# Patient Record
Sex: MALE | Race: ASIAN | NOT HISPANIC OR LATINO | URBAN - METROPOLITAN AREA
[De-identification: names, ages, dates, MRNs, and addresses within clinical notes are randomized per-mention and may not be internally consistent; named-entity substitution may affect disease eponyms.]

---

## 2022-02-09 ENCOUNTER — INPATIENT (INPATIENT)
Facility: HOSPITAL | Age: 62
LOS: 3 days | Discharge: HOME | DRG: 872 | End: 2022-02-13
Attending: SPECIALIST | Admitting: SPECIALIST
Payer: MEDICAID

## 2022-02-09 VITALS
SYSTOLIC BLOOD PRESSURE: 136 MMHG | TEMPERATURE: 98 F | HEIGHT: 68 IN | WEIGHT: 169.98 LBS | RESPIRATION RATE: 20 BRPM | OXYGEN SATURATION: 97 % | DIASTOLIC BLOOD PRESSURE: 83 MMHG | HEART RATE: 125 BPM

## 2022-02-09 DIAGNOSIS — K81.0 ACUTE CHOLECYSTITIS: ICD-10-CM

## 2022-02-09 DIAGNOSIS — K81.9 CHOLECYSTITIS, UNSPECIFIED: ICD-10-CM

## 2022-02-09 LAB
ALBUMIN SERPL ELPH-MCNC: 2.9 G/DL — LOW (ref 3.3–5)
ALP SERPL-CCNC: 87 U/L — SIGNIFICANT CHANGE UP (ref 40–120)
ALT FLD-CCNC: 29 U/L — SIGNIFICANT CHANGE UP (ref 12–78)
ANION GAP SERPL CALC-SCNC: 9 MMOL/L — SIGNIFICANT CHANGE UP (ref 5–17)
APPEARANCE UR: CLEAR — SIGNIFICANT CHANGE UP
APTT BLD: 27 SEC — LOW (ref 27.5–35.5)
AST SERPL-CCNC: 20 U/L — SIGNIFICANT CHANGE UP (ref 15–37)
BACTERIA # UR AUTO: ABNORMAL
BASOPHILS # BLD AUTO: 0.05 K/UL — SIGNIFICANT CHANGE UP (ref 0–0.2)
BASOPHILS NFR BLD AUTO: 0.3 % — SIGNIFICANT CHANGE UP (ref 0–2)
BILIRUB SERPL-MCNC: 1.9 MG/DL — HIGH (ref 0.2–1.2)
BILIRUB UR-MCNC: NEGATIVE — SIGNIFICANT CHANGE UP
BLD GP AB SCN SERPL QL: SIGNIFICANT CHANGE UP
BUN SERPL-MCNC: 8 MG/DL — SIGNIFICANT CHANGE UP (ref 7–23)
CALCIUM SERPL-MCNC: 8.9 MG/DL — SIGNIFICANT CHANGE UP (ref 8.5–10.1)
CHLORIDE SERPL-SCNC: 98 MMOL/L — SIGNIFICANT CHANGE UP (ref 96–108)
CO2 SERPL-SCNC: 27 MMOL/L — SIGNIFICANT CHANGE UP (ref 22–31)
COLOR SPEC: YELLOW — SIGNIFICANT CHANGE UP
CREAT SERPL-MCNC: 0.71 MG/DL — SIGNIFICANT CHANGE UP (ref 0.5–1.3)
DIFF PNL FLD: ABNORMAL
EOSINOPHIL # BLD AUTO: 0.09 K/UL — SIGNIFICANT CHANGE UP (ref 0–0.5)
EOSINOPHIL NFR BLD AUTO: 0.6 % — SIGNIFICANT CHANGE UP (ref 0–6)
EPI CELLS # UR: SIGNIFICANT CHANGE UP
GLUCOSE SERPL-MCNC: 240 MG/DL — HIGH (ref 70–99)
GLUCOSE UR QL: 1000 MG/DL
HCT VFR BLD CALC: 40.8 % — SIGNIFICANT CHANGE UP (ref 39–50)
HGB BLD-MCNC: 14.3 G/DL — SIGNIFICANT CHANGE UP (ref 13–17)
IMM GRANULOCYTES NFR BLD AUTO: 0.6 % — SIGNIFICANT CHANGE UP (ref 0–1.5)
INR BLD: 1.16 RATIO — SIGNIFICANT CHANGE UP (ref 0.88–1.16)
KETONES UR-MCNC: ABNORMAL
LEUKOCYTE ESTERASE UR-ACNC: NEGATIVE — SIGNIFICANT CHANGE UP
LIDOCAIN IGE QN: 99 U/L — SIGNIFICANT CHANGE UP (ref 73–393)
LYMPHOCYTES # BLD AUTO: 1.6 K/UL — SIGNIFICANT CHANGE UP (ref 1–3.3)
LYMPHOCYTES # BLD AUTO: 9.8 % — LOW (ref 13–44)
MCHC RBC-ENTMCNC: 29 PG — SIGNIFICANT CHANGE UP (ref 27–34)
MCHC RBC-ENTMCNC: 35 GM/DL — SIGNIFICANT CHANGE UP (ref 32–36)
MCV RBC AUTO: 82.8 FL — SIGNIFICANT CHANGE UP (ref 80–100)
MONOCYTES # BLD AUTO: 1.61 K/UL — HIGH (ref 0–0.9)
MONOCYTES NFR BLD AUTO: 9.9 % — SIGNIFICANT CHANGE UP (ref 2–14)
NEUTROPHILS # BLD AUTO: 12.82 K/UL — HIGH (ref 1.8–7.4)
NEUTROPHILS NFR BLD AUTO: 78.8 % — HIGH (ref 43–77)
NITRITE UR-MCNC: NEGATIVE — SIGNIFICANT CHANGE UP
NRBC # BLD: 0 /100 WBCS — SIGNIFICANT CHANGE UP (ref 0–0)
PH UR: 6 — SIGNIFICANT CHANGE UP (ref 5–8)
PLATELET # BLD AUTO: 332 K/UL — SIGNIFICANT CHANGE UP (ref 150–400)
POTASSIUM SERPL-MCNC: 3.3 MMOL/L — LOW (ref 3.5–5.3)
POTASSIUM SERPL-SCNC: 3.3 MMOL/L — LOW (ref 3.5–5.3)
PROT SERPL-MCNC: 7.3 G/DL — SIGNIFICANT CHANGE UP (ref 6–8.3)
PROT UR-MCNC: 30 MG/DL
PROTHROM AB SERPL-ACNC: 13.5 SEC — SIGNIFICANT CHANGE UP (ref 10.6–13.6)
RBC # BLD: 4.93 M/UL — SIGNIFICANT CHANGE UP (ref 4.2–5.8)
RBC # FLD: 12.2 % — SIGNIFICANT CHANGE UP (ref 10.3–14.5)
RBC CASTS # UR COMP ASSIST: SIGNIFICANT CHANGE UP /HPF (ref 0–4)
SARS-COV-2 RNA SPEC QL NAA+PROBE: SIGNIFICANT CHANGE UP
SODIUM SERPL-SCNC: 134 MMOL/L — LOW (ref 135–145)
SP GR SPEC: 1.01 — SIGNIFICANT CHANGE UP (ref 1.01–1.02)
UROBILINOGEN FLD QL: 1
WBC # BLD: 16.27 K/UL — HIGH (ref 3.8–10.5)
WBC # FLD AUTO: 16.27 K/UL — HIGH (ref 3.8–10.5)

## 2022-02-09 PROCEDURE — 93010 ELECTROCARDIOGRAM REPORT: CPT

## 2022-02-09 PROCEDURE — 74177 CT ABD & PELVIS W/CONTRAST: CPT | Mod: 26,MA

## 2022-02-09 PROCEDURE — 99222 1ST HOSP IP/OBS MODERATE 55: CPT

## 2022-02-09 PROCEDURE — 99285 EMERGENCY DEPT VISIT HI MDM: CPT

## 2022-02-09 PROCEDURE — 99222 1ST HOSP IP/OBS MODERATE 55: CPT | Mod: GC

## 2022-02-09 RX ORDER — POTASSIUM CHLORIDE 20 MEQ
40 PACKET (EA) ORAL ONCE
Refills: 0 | Status: COMPLETED | OUTPATIENT
Start: 2022-02-09 | End: 2022-02-09

## 2022-02-09 RX ORDER — MORPHINE SULFATE 50 MG/1
4 CAPSULE, EXTENDED RELEASE ORAL ONCE
Refills: 0 | Status: DISCONTINUED | OUTPATIENT
Start: 2022-02-09 | End: 2022-02-09

## 2022-02-09 RX ORDER — SODIUM CHLORIDE 9 MG/ML
1000 INJECTION INTRAMUSCULAR; INTRAVENOUS; SUBCUTANEOUS
Refills: 0 | Status: DISCONTINUED | OUTPATIENT
Start: 2022-02-09 | End: 2022-02-13

## 2022-02-09 RX ORDER — PIPERACILLIN AND TAZOBACTAM 4; .5 G/20ML; G/20ML
3.38 INJECTION, POWDER, LYOPHILIZED, FOR SOLUTION INTRAVENOUS EVERY 8 HOURS
Refills: 0 | Status: DISCONTINUED | OUTPATIENT
Start: 2022-02-10 | End: 2022-02-13

## 2022-02-09 RX ORDER — ONDANSETRON 8 MG/1
4 TABLET, FILM COATED ORAL EVERY 6 HOURS
Refills: 0 | Status: DISCONTINUED | OUTPATIENT
Start: 2022-02-09 | End: 2022-02-13

## 2022-02-09 RX ORDER — KETOROLAC TROMETHAMINE 30 MG/ML
15 SYRINGE (ML) INJECTION EVERY 4 HOURS
Refills: 0 | Status: DISCONTINUED | OUTPATIENT
Start: 2022-02-09 | End: 2022-02-13

## 2022-02-09 RX ORDER — PIPERACILLIN AND TAZOBACTAM 4; .5 G/20ML; G/20ML
3.38 INJECTION, POWDER, LYOPHILIZED, FOR SOLUTION INTRAVENOUS ONCE
Refills: 0 | Status: COMPLETED | OUTPATIENT
Start: 2022-02-09 | End: 2022-02-09

## 2022-02-09 RX ORDER — SODIUM CHLORIDE 9 MG/ML
1000 INJECTION INTRAMUSCULAR; INTRAVENOUS; SUBCUTANEOUS ONCE
Refills: 0 | Status: COMPLETED | OUTPATIENT
Start: 2022-02-09 | End: 2022-02-09

## 2022-02-09 RX ORDER — ACETAMINOPHEN 500 MG
1000 TABLET ORAL EVERY 6 HOURS
Refills: 0 | Status: DISCONTINUED | OUTPATIENT
Start: 2022-02-09 | End: 2022-02-13

## 2022-02-09 RX ORDER — ONDANSETRON 8 MG/1
4 TABLET, FILM COATED ORAL ONCE
Refills: 0 | Status: COMPLETED | OUTPATIENT
Start: 2022-02-09 | End: 2022-02-09

## 2022-02-09 RX ORDER — LANOLIN ALCOHOL/MO/W.PET/CERES
3 CREAM (GRAM) TOPICAL AT BEDTIME
Refills: 0 | Status: DISCONTINUED | OUTPATIENT
Start: 2022-02-09 | End: 2022-02-13

## 2022-02-09 RX ADMIN — Medication 40 MILLIEQUIVALENT(S): at 23:09

## 2022-02-09 RX ADMIN — SODIUM CHLORIDE 1000 MILLILITER(S): 9 INJECTION INTRAMUSCULAR; INTRAVENOUS; SUBCUTANEOUS at 19:30

## 2022-02-09 RX ADMIN — MORPHINE SULFATE 4 MILLIGRAM(S): 50 CAPSULE, EXTENDED RELEASE ORAL at 20:00

## 2022-02-09 RX ADMIN — Medication 1000 MILLIGRAM(S): at 23:15

## 2022-02-09 RX ADMIN — ONDANSETRON 4 MILLIGRAM(S): 8 TABLET, FILM COATED ORAL at 19:30

## 2022-02-09 RX ADMIN — PIPERACILLIN AND TAZOBACTAM 200 GRAM(S): 4; .5 INJECTION, POWDER, LYOPHILIZED, FOR SOLUTION INTRAVENOUS at 22:00

## 2022-02-09 RX ADMIN — MORPHINE SULFATE 4 MILLIGRAM(S): 50 CAPSULE, EXTENDED RELEASE ORAL at 22:05

## 2022-02-09 RX ADMIN — MORPHINE SULFATE 4 MILLIGRAM(S): 50 CAPSULE, EXTENDED RELEASE ORAL at 19:30

## 2022-02-09 RX ADMIN — MORPHINE SULFATE 4 MILLIGRAM(S): 50 CAPSULE, EXTENDED RELEASE ORAL at 21:35

## 2022-02-09 NOTE — ED ADULT NURSE NOTE - OBJECTIVE STATEMENT
Pt with right sided abd pain nausea and vomiting for the past 3 days - pt unable to tolerate anything orally

## 2022-02-09 NOTE — H&P ADULT - NSHPREVIEWOFSYSTEMS_GEN_ALL_CORE
Constitutional: Denies fever, fatigue or weight loss.  Skin: Denies rash.  Eyes: Denies recent vision problems or eye pain.  ENT: Denies congestion, ear pain, or sore throat.  Endocrine: Denies thyroid problems.  Cardiovascular: Denies chest pain or palpation.  Respiratory: Denies cough, shortness of breath, congestion, or wheezing.  Gastrointestinal: SEE HPI  Genitourinary: Denies dysuria.  Musculoskeletal: Denies joint swelling.  Neurologic: Denies headache.

## 2022-02-09 NOTE — CONSULT NOTE ADULT - PROBLEM SELECTOR RECOMMENDATION 9
- admit for sepsis 2/2 acute cholecystitis with suspected perforation   - pt tachycardic with leukocytosis 16.27  - s/p 1 L NS in ED. started on maintenance IVF @ 75cc/hr   - s/p zosyn x 1, continue IV zosyn  - follow up cultures   - CT A/P revealed suspected cholelithiasis  - plan for OR for lap cathleen vs IR for possible drainage tomorrow   - pt with no known history of ischemic or valvular disease, EKG reveals sinus tachycardia  - denies any past intolerance to anesthesia  *****Patient is medically optimized and stable, and is considered low risk for a low risk procedure***** - admit for sepsis 2/2 acute cholecystitis with suspected perforation   - pt tachycardic with leukocytosis 16.27  - s/p 1 L NS in ED. started on maintenance IVF @ 75cc/hr   - s/p zosyn x 1, continue IV zosyn  - follow up cultures   - CT A/P revealed suspected cholelithiasis  - plan for OR for lap cathleen vs IR for possible drainage tomorrow   - pt with no known history of ischemic or valvular disease, EKG reveals sinus tachycardia. he appears euvolemic on exam. hypokalemic on presentation. repleted- repeat am labs.  - denies any past intolerance to anesthesia. RCRI class 1 risk. pt is medically optimized to proceed with surgical/IR intervention. - admit for sepsis 2/2 acute cholecystitis with suspected perforation   - pt tachycardic with leukocytosis 16.27  - s/p 1 L NS in ED. started on maintenance IVF @ 75cc/hr   - s/p zosyn x 1, continue IV zosyn  - abx already given. obtain blood cultures  - CT A/P revealed suspected cholelithiasis  - plan for OR for lap cathleen vs IR for possible drainage tomorrow   - pt with no known history of ischemic or valvular disease, EKG reveals sinus tachycardia. he appears euvolemic on exam. hypokalemic on presentation. repleted- repeat am labs.  - denies any past intolerance to anesthesia. RCRI class 1 risk. pt is medically optimized to proceed with surgical/IR intervention.

## 2022-02-09 NOTE — ED PROVIDER NOTE - OBJECTIVE STATEMENT
pt c/o 3 days of right abd pain, associated with n/v constipation. no fevers, chills, ha, cp, sob, cough, dysuria, hematuria, freq.  pmd - fierro

## 2022-02-09 NOTE — CONSULT NOTE ADULT - ASSESSMENT
CHARTING IN PROGRESS    63 yo m pmh HTN, HLD, type 2 DM presents with abdominal pain , nausea and vomiting, found to have Acute cholecystitis with suspected associated perforation, admit for further management     #Acute Cholecystitis   - admit for sepsis 2/2 acute cholecystitis with suspected perforation   - pt tachycardic with leukocytosis 16.27  - s/p 1 L NS in ED. started on maintenance IVF @ 75cc/hr   - s/p zosyn x 1, continue   - follow up cultures   - plan for OR vs IR tomorrow ..... CHARTING IN PROGRESS*************    61 yo m pmh HTN, HLD, type 2 DM presents with abdominal pain , nausea and vomiting, found to have Acute cholecystitis with suspected associated perforation, admit for further management     #Acute Cholecystitis   - admit for sepsis 2/2 acute cholecystitis with suspected perforation   - pt tachycardic with leukocytosis 16.27  - s/p 1 L NS in ED. started on maintenance IVF @ 75cc/hr   - s/p zosyn x 1, continue   - follow up cultures   - plan for OR vs IR tomorrow .....  - pt with no known history of ischemic or valvular disease.... EKG.... 61 yo m pmh HTN, HLD, type 2 DM presents with abdominal pain , nausea and vomiting, found to have Acute cholecystitis with suspected associated perforation, admit for further management      63 yo m pmh HTN, HLD, type 2 DM presents with abdominal pain found to have Acute cholecystitis with suspected associated perforation, admit for further management

## 2022-02-09 NOTE — H&P ADULT - ASSESSMENT
This is a 62y year old Male presenting with radiographic evidence suspicious for perforated gallbladder.

## 2022-02-09 NOTE — H&P ADULT - NSHPLABSRESULTS_GEN_ALL_CORE
Data:  T(C): 36.4 (22 @ 18:35), Max: 36.4 (22 @ 18:35)  HR: 125 (22 @ 18:35) (125 - 125)  BP: 136/83 (22 @ 18:35) (136/83 - 136/83)  RR: 20 (22 @ 18:35) (20 - 20)  SpO2: 97% (22 @ 18:35) (97% - 97%)                        14.3   16.27 )-----------( 332      ( 2022 20:03 )             40.8         134<L>  |  98  |  8   ----------------------------<  240<H>  3.3<L>   |  27  |  0.71    Ca    8.9      2022 20:03    TPro  7.3  /  Alb  2.9<L>  /  TBili  1.9<H>  /  DBili  x   /  AST  20  /  ALT  29  /  AlkPhos  87  -      LIVER FUNCTIONS - ( 2022 20:03 )  Alb: 2.9 g/dL / Pro: 7.3 g/dL / ALK PHOS: 87 U/L / ALT: 29 U/L / AST: 20 U/L / GGT: x           Urinalysis Basic - ( 2022 21:47 )    Color: Yellow / Appearance: Clear / S.015 / pH: x  Gluc: x / Ketone: Large  / Bili: Negative / Urobili: 1   Blood: x / Protein: 30 mg/dL / Nitrite: Negative   Leuk Esterase: Negative / RBC: 0-2 /HPF / WBC x   Sq Epi: x / Non Sq Epi: Occasional / Bacteria: Occasional    Radiology:  < from: CT Abdomen and Pelvis w/ IV Cont (22 @ 21:20) >    FINDINGS:  LOWER CHEST: Bibasilar atelectasis. Coronary calcifications. Mild   bilateral gynecomastia.    LIVER: Within normal limits.  BILE DUCTS: Normal caliber.  GALLBLADDER: Suspected cholelithiasis. Distended gallbladder with diffuse   wall thickening and surrounding inflammatory changes. Wall discontinuity   at the inferior aspect of the gallbladder suspicious for perforation (2,   57). No organized collection.  SPLEEN: Within normal limits.  PANCREAS: Within normal limits.  ADRENALS: Within normal limits.  KIDNEYS/URETERS: Right renal hypodensity too small to characterize. No   hydronephrosis. Homogeneous enhancement.    BLADDER: Within normal limits.  REPRODUCTIVE ORGANS: Enlarged prostate.    BOWEL: No bowel obstruction. Appendix is normal. Mild wall thickening at   the hepatic flexure likely related to adjacent pericholecystic   inflammatory changes.  PERITONEUM: No ascites. Nonspecific small calcifications in the   peritoneum in the left abdomen, possibly calcified nodes.  VESSELS: IVC filter.  RETROPERITONEUM/LYMPH NODES: No lymphadenopathy.  ABDOMINAL WALL: Within normal limits.  BONES: Degenerative changes.    IMPRESSION:  Acute cholecystitis with suspected associated perforation.    --- End of Report ---    SAFIA NELSON MD; Attending Radiologist  This document has been electronically signed. 2022  9:35PM    < end of copied text >

## 2022-02-09 NOTE — H&P ADULT - HISTORY OF PRESENT ILLNESS
This is a 62y year old Male with no significant PMHx presenting with complaint of RUQ pain x3 days. Pt states pain started after he "ate some bad food" and began having a few hours after. Pain described as constant, sharp, stabbing RUQ radiating to the back. Admits to associated anorexia. Denies history of nausea/vomiting/diarrhea, chest pain, shortness of breath, urinary complaints.

## 2022-02-09 NOTE — CONSULT NOTE ADULT - SUBJECTIVE AND OBJECTIVE BOX
CHARTING IN PROGRESS *****    63 yo m pmh HTN, HLD, type 2 DM presents with abdominal pain , nausea and vomiting,    In the ED cbc, coags, cmp, lipase, UA, type and screen and CT abd/pelv were performed. significant for wbc 16.27, Na 134, K 3.3, glucose 240, albumin 2.9, bili 1.9. UA with lg ketones, small blood , 1000 glucose. CT revealed Suspected cholelithiasis. Distended gallbladder with diffuse wall thickening and surrounding inflammatory changes. Wall discontinuity at the inferior aspect of the gallbladder suspicious for perforation. No organized collection.    PMH: HTN, HLD, Type 2 DM    PSH: denies     ALL: NKDA    MEDS: Lisinopril 2.5mg, Glipizide 5mg, Simvastatin 5mg, Pepcid 40mg    SOCIAL: From home, denies etoh, tobacco, recreational drugs. COVID vaccinated: Moderna 3/3   HPI from admission: This is a 62y year old Male with no significant PMHx presenting with complaint of RUQ pain x3 days. Pt states pain started after he "ate some bad food" and began having a few hours after. Pain described as constant, sharp, stabbing RUQ radiating to the back. Admits to associated anorexia. Denies history of nausea/vomiting/diarrhea, chest pain, shortness of breath, urinary complaints.     63 yo m pmh HTN, HLD, type 2 DM presents with abdominal pain , nausea and vomiting. States the abdominal pain first began 3 days ago and thought it was because of the food he had. However, he was prompted to come to the ED when the pain became progressively worse. He states the pain was a 8/10 when coming to the ED and describes it as sharp and constant with radiation to the back.     In the ED cbc, coags, cmp, lipase, UA, type and screen and CT abd/pelv were performed. significant for wbc 16.27, Na 134, K 3.3, glucose 240, albumin 2.9, bili 1.9. UA with lg ketones, small blood , 1000 glucose. CT revealed Suspected cholelithiasis. Distended gallbladder with diffuse wall thickening and surrounding inflammatory changes. Wall discontinuity at the inferior aspect of the gallbladder suspicious for perforation. No organized collection.    PMH: HTN, HLD, Type 2 DM    PSH: denies     ALL: NKDA    MEDS: Lisinopril 2.5mg, Glipizide 5mg, Simvastatin 5mg, Pepcid 40mg    SOCIAL: From home, denies etoh, tobacco, recreational drugs. COVID vaccinated: Moderna 3/3      Constitutional: denies fever, chills, sweating  HEENT: denies headache, dizziness, or lightheadedness  Respiratory: denies SOB, cough, or wheezing  Cardiovascular: denies CP, palpitations  Gastrointestinal: admits to RUQ pain radiating to back. denies nausea, vomiting, diarrhea, constipation, or bloody stools  Genitourinary: denies painful urination, increased frequency, urgency, or bloody urine  Skin/Breast: denies rashes or itching  Musculoskeletal: denies muscle aches, joint swelling, or muscle weakness  Neurologic: denies loss of sensation, numbness, or tingling    T(C): 39.2 (02-09-22 @ 22:45), Max: 39.2 (02-09-22 @ 22:45)  HR: 102 (02-09-22 @ 22:45) (102 - 125)  BP: 148/94 (02-09-22 @ 22:45) (136/83 - 148/94)  RR: 16 (02-09-22 @ 22:45) (16 - 20)  SpO2: 94% (02-09-22 @ 22:45) (94% - 97%)    Physical Exam:  Gen: well appearing, NAD  HEENT: NCAT, PEERLA b/l, EOMI b/l, no conjunctival erythema  Cardio: regular rate and rhythm, +s1s2, no murmurs, rubs, or gallops  Pulm: CTA b/l, no wheezes, rales or rhonchi  Abdomen: soft, distended, tender to palpation RUQ on deep palpation, +BS x4 quadrants  Extremities: no clubbing, cyanosis or edema, +2 pedal pulses  Neuro: AAOx3  Skin: warm and dry     HPI from admission: This is a 62y year old Male with no significant PMHx presenting with complaint of RUQ pain x3 days. Pt states pain started after he "ate some bad food" and began having a few hours after. Pain described as constant, sharp, stabbing RUQ radiating to the back. Admits to associated anorexia. Denies history of nausea/vomiting/diarrhea, chest pain, shortness of breath, urinary complaints.     63 yo m pmh HTN, HLD, type 2 DM presents with abdominal pain. States the abdominal pain first began 3 days ago and thought it was because of the food he had. However, he was prompted to come to the ED when the pain became progressively worse. He states the pain was a 8/10 when coming to the ED and describes it as sharp and constant with radiation to the back.     In the ED cbc, coags, cmp, lipase, UA, type and screen and CT abd/pelv were performed. significant for wbc 16.27, Na 134, K 3.3, glucose 240, albumin 2.9, bili 1.9. UA with lg ketones, small blood , 1000 glucose. CT revealed Suspected cholelithiasis. Distended gallbladder with diffuse wall thickening and surrounding inflammatory changes. Wall discontinuity at the inferior aspect of the gallbladder suspicious for perforation. No organized collection.    PMH: HTN, HLD, Type 2 DM    PSH: denies     ALL: NKDA    MEDS: Lisinopril 2.5mg, Glipizide 5mg, Simvastatin 5mg, Pepcid 40mg    SOCIAL: From home, denies etoh, tobacco, recreational drugs. COVID vaccinated: Moderna 3/3      Constitutional: denies fever, chills, sweating  HEENT: denies headache, dizziness, or lightheadedness  Respiratory: denies SOB, cough, or wheezing  Cardiovascular: denies CP, palpitations  Gastrointestinal: admits to RUQ pain radiating to back. denies nausea, vomiting, diarrhea, constipation, or bloody stools  Genitourinary: denies painful urination, increased frequency, urgency, or bloody urine  Skin/Breast: denies rashes or itching  Musculoskeletal: denies muscle aches, joint swelling, or muscle weakness  Neurologic: denies loss of sensation, numbness, or tingling    T(C): 39.2 (22 @ 22:45), Max: 39.2 (22 @ 22:45)  HR: 102 (22 @ 22:45) (102 - 125)  BP: 148/94 (22 @ 22:45) (136/83 - 148/94)  RR: 16 (22 @ 22:45) (16 - 20)  SpO2: 94% (22 @ 22:45) (94% - 97%)    Physical Exam:  Gen: well appearing, NAD  HEENT: NCAT, PEERLA b/l, EOMI b/l, no conjunctival erythema  Cardio: regular rate and rhythm, +s1s2, no murmurs, rubs, or gallops  Pulm: CTA b/l, no wheezes, rales or rhonchi  Abdomen: soft, distended, tender to palpation RUQ on deep palpation, +BS x4 quadrants  Extremities: no clubbing, cyanosis or edema, +2 pedal pulses  Neuro: AAOx3  Skin: warm and dry                          14.3   16.27 )-----------( 332      ( 2022 20:03 )             40.8     2022 20:03    134    |  98     |  8      ----------------------------<  240    3.3     |  27     |  0.71     Ca    8.9        2022 20:03    TPro  7.3    /  Alb  2.9    /  TBili  1.9    /  DBili  x      /  AST  20     /  ALT  29     /  AlkPhos  87     2022 20:03    LIVER FUNCTIONS - ( 2022 20:03 )  Alb: 2.9 g/dL / Pro: 7.3 g/dL / ALK PHOS: 87 U/L / ALT: 29 U/L / AST: 20 U/L / GGT: x           PT/INR - ( 2022 22:15 )   PT: 13.5 sec;   INR: 1.16 ratio         PTT - ( 2022 22:15 )  PTT:27.0 sec  CAPILLARY BLOOD GLUCOSE    Urinalysis Basic - ( 2022 21:47 )    Color: Yellow / Appearance: Clear / S.015 / pH: x  Gluc: x / Ketone: Large  / Bili: Negative / Urobili: 1   Blood: x / Protein: 30 mg/dL / Nitrite: Negative   Leuk Esterase: Negative / RBC: 0-2 /HPF / WBC x   Sq Epi: x / Non Sq Epi: Occasional / Bacteria: Occasional

## 2022-02-09 NOTE — H&P ADULT - NSHPPHYSICALEXAM_GEN_ALL_CORE
GENERAL: No acute distress, well-developed  HEAD:  Atraumatic, Normocephalic  ABDOMEN: Soft, tender RUQ, mildly-distended  NEUROLOGY: A&O x 3, no focal deficits

## 2022-02-09 NOTE — H&P ADULT - PROBLEM SELECTOR PLAN 1
-Discussed with Dr. Leyva  -admit to surgery  -Will d/w IR for poss drainage vs OR for lap cathleen  -K repleted  -Medicine consulted  -NPO, IVF, supportive care  -IV antibiotics started    Surgical Team Contact Information  Spectralink: Ext: [phone] or [phone]  Pager: [phone]

## 2022-02-09 NOTE — ED ADULT TRIAGE NOTE - CHIEF COMPLAINT QUOTE
Patient c/o right side abdominal pain, nausea, vomiting, and constipation. States unable to keep anything down while eating for x3 days

## 2022-02-09 NOTE — CONSULT NOTE ADULT - PROBLEM SELECTOR RECOMMENDATION 4
Chronic, known history of T2DM  - Hold home oral meds  - Hypoglycemia protocol, fingerstick glucose QAC&HS   - NPO for OR tomm, then switch to consistent carb/DASH diet after procedure   - F/u AM HbA1c

## 2022-02-09 NOTE — CONSULT NOTE ADULT - PROBLEM SELECTOR RECOMMENDATION 2
Chronic, stable on admission   - EKG: sinus tachycardia  - denies any prior cardiac events or cardiac FH  - continue with home medication Lisinopril 2.5mg with hold parameters - continue with home medication Lisinopril 2.5mg with hold parameters  - EKG: sinus tachycardia  - denies any prior cardiac events or cardiac FH

## 2022-02-10 DIAGNOSIS — E78.5 HYPERLIPIDEMIA, UNSPECIFIED: ICD-10-CM

## 2022-02-10 DIAGNOSIS — I10 ESSENTIAL (PRIMARY) HYPERTENSION: ICD-10-CM

## 2022-02-10 DIAGNOSIS — Z29.9 ENCOUNTER FOR PROPHYLACTIC MEASURES, UNSPECIFIED: ICD-10-CM

## 2022-02-10 DIAGNOSIS — E11.9 TYPE 2 DIABETES MELLITUS WITHOUT COMPLICATIONS: ICD-10-CM

## 2022-02-10 LAB
A1C WITH ESTIMATED AVERAGE GLUCOSE RESULT: 10.3 % — HIGH (ref 4–5.6)
ALBUMIN SERPL ELPH-MCNC: 2.6 G/DL — LOW (ref 3.3–5)
ALP SERPL-CCNC: 80 U/L — SIGNIFICANT CHANGE UP (ref 40–120)
ALT FLD-CCNC: 28 U/L — SIGNIFICANT CHANGE UP (ref 12–78)
ANION GAP SERPL CALC-SCNC: 6 MMOL/L — SIGNIFICANT CHANGE UP (ref 5–17)
AST SERPL-CCNC: 20 U/L — SIGNIFICANT CHANGE UP (ref 15–37)
BASOPHILS # BLD AUTO: 0.05 K/UL — SIGNIFICANT CHANGE UP (ref 0–0.2)
BASOPHILS NFR BLD AUTO: 0.3 % — SIGNIFICANT CHANGE UP (ref 0–2)
BILIRUB SERPL-MCNC: 2.3 MG/DL — HIGH (ref 0.2–1.2)
BUN SERPL-MCNC: 7 MG/DL — SIGNIFICANT CHANGE UP (ref 7–23)
CALCIUM SERPL-MCNC: 8.2 MG/DL — LOW (ref 8.5–10.1)
CHLORIDE SERPL-SCNC: 102 MMOL/L — SIGNIFICANT CHANGE UP (ref 96–108)
CO2 SERPL-SCNC: 27 MMOL/L — SIGNIFICANT CHANGE UP (ref 22–31)
CREAT SERPL-MCNC: 0.79 MG/DL — SIGNIFICANT CHANGE UP (ref 0.5–1.3)
CULTURE RESULTS: SIGNIFICANT CHANGE UP
EOSINOPHIL # BLD AUTO: 0.15 K/UL — SIGNIFICANT CHANGE UP (ref 0–0.5)
EOSINOPHIL NFR BLD AUTO: 0.9 % — SIGNIFICANT CHANGE UP (ref 0–6)
ESTIMATED AVERAGE GLUCOSE: 249 MG/DL — HIGH (ref 68–114)
GLUCOSE SERPL-MCNC: 199 MG/DL — HIGH (ref 70–99)
HCT VFR BLD CALC: 41.5 % — SIGNIFICANT CHANGE UP (ref 39–50)
HCV AB S/CO SERPL IA: 0.09 S/CO — SIGNIFICANT CHANGE UP (ref 0–0.99)
HCV AB SERPL-IMP: SIGNIFICANT CHANGE UP
HGB BLD-MCNC: 14.7 G/DL — SIGNIFICANT CHANGE UP (ref 13–17)
IMM GRANULOCYTES NFR BLD AUTO: 0.7 % — SIGNIFICANT CHANGE UP (ref 0–1.5)
LYMPHOCYTES # BLD AUTO: 1.83 K/UL — SIGNIFICANT CHANGE UP (ref 1–3.3)
LYMPHOCYTES # BLD AUTO: 11 % — LOW (ref 13–44)
MCHC RBC-ENTMCNC: 29.4 PG — SIGNIFICANT CHANGE UP (ref 27–34)
MCHC RBC-ENTMCNC: 35.4 GM/DL — SIGNIFICANT CHANGE UP (ref 32–36)
MCV RBC AUTO: 83 FL — SIGNIFICANT CHANGE UP (ref 80–100)
MONOCYTES # BLD AUTO: 1.55 K/UL — HIGH (ref 0–0.9)
MONOCYTES NFR BLD AUTO: 9.3 % — SIGNIFICANT CHANGE UP (ref 2–14)
NEUTROPHILS # BLD AUTO: 12.89 K/UL — HIGH (ref 1.8–7.4)
NEUTROPHILS NFR BLD AUTO: 77.8 % — HIGH (ref 43–77)
NRBC # BLD: 0 /100 WBCS — SIGNIFICANT CHANGE UP (ref 0–0)
PLATELET # BLD AUTO: 285 K/UL — SIGNIFICANT CHANGE UP (ref 150–400)
POTASSIUM SERPL-MCNC: 3.5 MMOL/L — SIGNIFICANT CHANGE UP (ref 3.5–5.3)
POTASSIUM SERPL-SCNC: 3.5 MMOL/L — SIGNIFICANT CHANGE UP (ref 3.5–5.3)
PROT SERPL-MCNC: 6.5 G/DL — SIGNIFICANT CHANGE UP (ref 6–8.3)
RBC # BLD: 5 M/UL — SIGNIFICANT CHANGE UP (ref 4.2–5.8)
RBC # FLD: 12.3 % — SIGNIFICANT CHANGE UP (ref 10.3–14.5)
SODIUM SERPL-SCNC: 135 MMOL/L — SIGNIFICANT CHANGE UP (ref 135–145)
SPECIMEN SOURCE: SIGNIFICANT CHANGE UP
WBC # BLD: 16.58 K/UL — HIGH (ref 3.8–10.5)
WBC # FLD AUTO: 16.58 K/UL — HIGH (ref 3.8–10.5)

## 2022-02-10 PROCEDURE — 99221 1ST HOSP IP/OBS SF/LOW 40: CPT

## 2022-02-10 PROCEDURE — 99231 SBSQ HOSP IP/OBS SF/LOW 25: CPT

## 2022-02-10 PROCEDURE — 99232 SBSQ HOSP IP/OBS MODERATE 35: CPT

## 2022-02-10 RX ORDER — DEXTROSE 50 % IN WATER 50 %
25 SYRINGE (ML) INTRAVENOUS ONCE
Refills: 0 | Status: DISCONTINUED | OUTPATIENT
Start: 2022-02-10 | End: 2022-02-13

## 2022-02-10 RX ORDER — INSULIN LISPRO 100/ML
VIAL (ML) SUBCUTANEOUS
Refills: 0 | Status: DISCONTINUED | OUTPATIENT
Start: 2022-02-10 | End: 2022-02-10

## 2022-02-10 RX ORDER — INSULIN LISPRO 100/ML
VIAL (ML) SUBCUTANEOUS EVERY 6 HOURS
Refills: 0 | Status: DISCONTINUED | OUTPATIENT
Start: 2022-02-10 | End: 2022-02-13

## 2022-02-10 RX ORDER — SIMVASTATIN 20 MG/1
5 TABLET, FILM COATED ORAL AT BEDTIME
Refills: 0 | Status: DISCONTINUED | OUTPATIENT
Start: 2022-02-10 | End: 2022-02-13

## 2022-02-10 RX ORDER — INSULIN LISPRO 100/ML
VIAL (ML) SUBCUTANEOUS AT BEDTIME
Refills: 0 | Status: DISCONTINUED | OUTPATIENT
Start: 2022-02-10 | End: 2022-02-10

## 2022-02-10 RX ORDER — LISINOPRIL 2.5 MG/1
2.5 TABLET ORAL DAILY
Refills: 0 | Status: DISCONTINUED | OUTPATIENT
Start: 2022-02-10 | End: 2022-02-13

## 2022-02-10 RX ORDER — LISINOPRIL 2.5 MG/1
2.5 TABLET ORAL DAILY
Refills: 0 | Status: DISCONTINUED | OUTPATIENT
Start: 2022-02-10 | End: 2022-02-10

## 2022-02-10 RX ORDER — DEXTROSE 50 % IN WATER 50 %
15 SYRINGE (ML) INTRAVENOUS ONCE
Refills: 0 | Status: DISCONTINUED | OUTPATIENT
Start: 2022-02-10 | End: 2022-02-13

## 2022-02-10 RX ORDER — LISINOPRIL 2.5 MG/1
1 TABLET ORAL
Qty: 0 | Refills: 0 | DISCHARGE

## 2022-02-10 RX ORDER — SODIUM CHLORIDE 9 MG/ML
1000 INJECTION, SOLUTION INTRAVENOUS
Refills: 0 | Status: DISCONTINUED | OUTPATIENT
Start: 2022-02-10 | End: 2022-02-13

## 2022-02-10 RX ORDER — SIMVASTATIN 20 MG/1
1 TABLET, FILM COATED ORAL
Qty: 0 | Refills: 0 | DISCHARGE

## 2022-02-10 RX ORDER — GLUCAGON INJECTION, SOLUTION 0.5 MG/.1ML
1 INJECTION, SOLUTION SUBCUTANEOUS ONCE
Refills: 0 | Status: DISCONTINUED | OUTPATIENT
Start: 2022-02-10 | End: 2022-02-13

## 2022-02-10 RX ORDER — FAMOTIDINE 10 MG/ML
40 INJECTION INTRAVENOUS AT BEDTIME
Refills: 0 | Status: DISCONTINUED | OUTPATIENT
Start: 2022-02-10 | End: 2022-02-13

## 2022-02-10 RX ORDER — DEXTROSE 50 % IN WATER 50 %
12.5 SYRINGE (ML) INTRAVENOUS ONCE
Refills: 0 | Status: DISCONTINUED | OUTPATIENT
Start: 2022-02-10 | End: 2022-02-13

## 2022-02-10 RX ORDER — FAMOTIDINE 10 MG/ML
1 INJECTION INTRAVENOUS
Qty: 0 | Refills: 0 | DISCHARGE

## 2022-02-10 RX ADMIN — Medication 1000 MILLIGRAM(S): at 23:41

## 2022-02-10 RX ADMIN — FAMOTIDINE 40 MILLIGRAM(S): 10 INJECTION INTRAVENOUS at 23:42

## 2022-02-10 RX ADMIN — PIPERACILLIN AND TAZOBACTAM 25 GRAM(S): 4; .5 INJECTION, POWDER, LYOPHILIZED, FOR SOLUTION INTRAVENOUS at 06:16

## 2022-02-10 RX ADMIN — Medication 1000 MILLIGRAM(S): at 13:22

## 2022-02-10 RX ADMIN — Medication 2: at 12:22

## 2022-02-10 RX ADMIN — SODIUM CHLORIDE 75 MILLILITER(S): 9 INJECTION INTRAMUSCULAR; INTRAVENOUS; SUBCUTANEOUS at 17:39

## 2022-02-10 RX ADMIN — Medication 2: at 06:16

## 2022-02-10 RX ADMIN — Medication 1000 MILLIGRAM(S): at 06:16

## 2022-02-10 RX ADMIN — PIPERACILLIN AND TAZOBACTAM 25 GRAM(S): 4; .5 INJECTION, POWDER, LYOPHILIZED, FOR SOLUTION INTRAVENOUS at 23:44

## 2022-02-10 RX ADMIN — PIPERACILLIN AND TAZOBACTAM 25 GRAM(S): 4; .5 INJECTION, POWDER, LYOPHILIZED, FOR SOLUTION INTRAVENOUS at 13:56

## 2022-02-10 RX ADMIN — SIMVASTATIN 5 MILLIGRAM(S): 20 TABLET, FILM COATED ORAL at 23:41

## 2022-02-10 RX ADMIN — LISINOPRIL 2.5 MILLIGRAM(S): 2.5 TABLET ORAL at 06:27

## 2022-02-10 RX ADMIN — Medication 1000 MILLIGRAM(S): at 17:39

## 2022-02-10 RX ADMIN — Medication 1000 MILLIGRAM(S): at 06:18

## 2022-02-10 RX ADMIN — Medication 1000 MILLIGRAM(S): at 12:22

## 2022-02-10 NOTE — PROGRESS NOTE ADULT - ASSESSMENT
63 yo m pmh HTN, HLD, type 2 DM presents with abdominal pain found to have Acute cholecystitis with suspected associated perforation, admit for further management

## 2022-02-10 NOTE — PATIENT PROFILE ADULT - FALL HARM RISK - UNIVERSAL INTERVENTIONS
Bed in lowest position, wheels locked, appropriate side rails in place/Call bell, personal items and telephone in reach/Instruct patient to call for assistance before getting out of bed or chair/Non-slip footwear when patient is out of bed/[place] to call system/Physically safe environment - no spills, clutter or unnecessary equipment/Purposeful Proactive Rounding/Room/bathroom lighting operational, light cord in reach

## 2022-02-10 NOTE — PROGRESS NOTE ADULT - SUBJECTIVE AND OBJECTIVE BOX
Patient is a 62y old  Male who presents with a chief complaint of Cholecystitis (2022 23:22)      INTERVAL HPI/OVERNIGHT EVENTS: Patient seen and examined at bedside. Refused  services at this time, conversation conducted in english. No overnight events occurred. Patient with complaint of RUQ pain. Denies fevers, chills, headache, lightheadedness, chest pain, dyspnea, abdominal pain, n/v/d/c.    MEDICATIONS  (STANDING):  acetaminophen     Tablet .. 1000 milliGRAM(s) Oral every 6 hours  dextrose 40% Gel 15 Gram(s) Oral once  dextrose 5%. 1000 milliLiter(s) (50 mL/Hr) IV Continuous <Continuous>  dextrose 5%. 1000 milliLiter(s) (100 mL/Hr) IV Continuous <Continuous>  dextrose 50% Injectable 25 Gram(s) IV Push once  dextrose 50% Injectable 12.5 Gram(s) IV Push once  dextrose 50% Injectable 25 Gram(s) IV Push once  famotidine  Oral Tab/Cap - Peds 40 milliGRAM(s) Oral at bedtime  glucagon  Injectable 1 milliGRAM(s) IntraMuscular once  insulin lispro (ADMELOG) corrective regimen sliding scale   SubCutaneous every 6 hours  lisinopril 2.5 milliGRAM(s) Oral daily  piperacillin/tazobactam IVPB.. 3.375 Gram(s) IV Intermittent every 8 hours  simvastatin 5 milliGRAM(s) Oral at bedtime  sodium chloride 0.9%. 1000 milliLiter(s) (75 mL/Hr) IV Continuous <Continuous>    MEDICATIONS  (PRN):  ketorolac   Injectable 15 milliGRAM(s) IV Push every 4 hours PRN Moderate Pain (4 - 6)  melatonin 3 milliGRAM(s) Oral at bedtime PRN Insomnia  ondansetron Injectable 4 milliGRAM(s) IV Push every 6 hours PRN Nausea      Allergies    No Known Allergies    Intolerances        REVIEW OF SYSTEMS:  CONSTITUTIONAL: No fever or chills  HEENT:  No headache, no sore throat  RESPIRATORY: No cough, wheezing, or shortness of breath  CARDIOVASCULAR: No chest pain, palpitations  GASTROINTESTINAL: admits to RUQ abdominal pain, denies nausea, vomiting   GENITOURINARY: No dysuria, frequency, or hematuria  NEUROLOGICAL: no focal weakness or dizziness  MUSCULOSKELETAL: no myalgias     Vital Signs Last 24 Hrs  T(C): 37.2 (10 Feb 2022 13:58), Max: 39.2 (2022 22:45)  T(F): 99 (10 Feb 2022 13:58), Max: 102.5 (2022 22:45)  HR: 76 (10 Feb 2022 13:58) (76 - 125)  BP: 104/62 (10 Feb 2022 13:58) (104/62 - 148/94)  BP(mean): --  RR: 16 (10 Feb 2022 13:58) (16 - 20)  SpO2: 95% (10 Feb 2022 13:58) (94% - 97%)    PHYSICAL EXAM:  Gen: well appearing, NAD  HEENT: NCAT, PEERLA b/l, EOMI b/l, no conjunctival erythema  Cardio: regular rate and rhythm, +s1s2, no murmurs, rubs, or gallops  Pulm: CTA b/l, no wheezes, rales or rhonchi  Abdomen: soft, distended, tender to palpation RUQ on deep palpation, +BS x4 quadrants  Extremities: no clubbing, cyanosis or edema, +2 pedal pulses  Neuro: AAOx3  Skin: warm and dry    LABS:                        14.7   16.58 )-----------( 285      ( 10 Feb 2022 04:44 )             41.5     CBC Full  -  ( 10 Feb 2022 04:44 )  WBC Count : 16.58 K/uL  Hemoglobin : 14.7 g/dL  Hematocrit : 41.5 %  Platelet Count - Automated : 285 K/uL  Mean Cell Volume : 83.0 fl  Mean Cell Hemoglobin : 29.4 pg  Mean Cell Hemoglobin Concentration : 35.4 gm/dL  Auto Neutrophil # : 12.89 K/uL  Auto Lymphocyte # : 1.83 K/uL  Auto Monocyte # : 1.55 K/uL  Auto Eosinophil # : 0.15 K/uL  Auto Basophil # : 0.05 K/uL  Auto Neutrophil % : 77.8 %  Auto Lymphocyte % : 11.0 %  Auto Monocyte % : 9.3 %  Auto Eosinophil % : 0.9 %  Auto Basophil % : 0.3 %    10 Feb 2022 04:44    135    |  102    |  7      ----------------------------<  199    3.5     |  27     |  0.79     Ca    8.2        10 Feb 2022 04:44    TPro  6.5    /  Alb  2.6    /  TBili  2.3    /  DBili  x      /  AST  20     /  ALT  28     /  AlkPhos  80     10 Feb 2022 04:44    PT/INR - ( 2022 22:15 )   PT: 13.5 sec;   INR: 1.16 ratio         PTT - ( 2022 22:15 )  PTT:27.0 sec  Urinalysis Basic - ( 2022 21:47 )    Color: Yellow / Appearance: Clear / S.015 / pH: x  Gluc: x / Ketone: Large  / Bili: Negative / Urobili: 1   Blood: x / Protein: 30 mg/dL / Nitrite: Negative   Leuk Esterase: Negative / RBC: 0-2 /HPF / WBC x   Sq Epi: x / Non Sq Epi: Occasional / Bacteria: Occasional      CAPILLARY BLOOD GLUCOSE      POCT Blood Glucose.: 136 mg/dL (10 Feb 2022 17:10)  POCT Blood Glucose.: 197 mg/dL (10 Feb 2022 11:25)  POCT Blood Glucose.: 214 mg/dL (10 Feb 2022 06:08)          RADIOLOGY & ADDITIONAL TESTS:    Personally reviewed.     Consultant(s) Notes Reviewed:  [x] YES  [ ] NO

## 2022-02-10 NOTE — CONSULT NOTE ADULT - CONSULT REASON
62y A1C with Estimated Average Glucose Result: 10.3 % (02-10-22 @ 08:52)   diabetes mellitus uncontrolled type 2
Medical management/optimization
dm2 uncontrolled

## 2022-02-10 NOTE — CONSULT NOTE ADULT - SUBJECTIVE AND OBJECTIVE BOX
Patient is a 62y old  Male who presents with a chief complaint of Cholecystitis (09 Feb 2022 23:22)      Reason For Consult: dm2 uncontrolled, hba1c 10.3%    HPI:  This is a 62y year old Male with no significant PMHx presenting with complaint of RUQ pain x3 days. Pt states pain started after he "ate some bad food" and began having a few hours after. Pain described as constant, sharp, stabbing RUQ radiating to the back. Admits to associated anorexia. Denies history of nausea/vomiting/diarrhea, chest pain, shortness of breath, urinary complaints.    (09 Feb 2022 22:34)      PAST MEDICAL & SURGICAL HISTORY:  No pertinent past medical history    No significant past surgical history        FAMILY HISTORY:        Social History:    MEDICATIONS  (STANDING):  acetaminophen     Tablet .. 1000 milliGRAM(s) Oral every 6 hours  dextrose 40% Gel 15 Gram(s) Oral once  dextrose 5%. 1000 milliLiter(s) (50 mL/Hr) IV Continuous <Continuous>  dextrose 5%. 1000 milliLiter(s) (100 mL/Hr) IV Continuous <Continuous>  dextrose 50% Injectable 25 Gram(s) IV Push once  dextrose 50% Injectable 12.5 Gram(s) IV Push once  dextrose 50% Injectable 25 Gram(s) IV Push once  famotidine  Oral Tab/Cap - Peds 40 milliGRAM(s) Oral at bedtime  glucagon  Injectable 1 milliGRAM(s) IntraMuscular once  insulin lispro (ADMELOG) corrective regimen sliding scale   SubCutaneous every 6 hours  lisinopril 2.5 milliGRAM(s) Oral daily  piperacillin/tazobactam IVPB.. 3.375 Gram(s) IV Intermittent every 8 hours  simvastatin 5 milliGRAM(s) Oral at bedtime  sodium chloride 0.9%. 1000 milliLiter(s) (75 mL/Hr) IV Continuous <Continuous>    MEDICATIONS  (PRN):  ketorolac   Injectable 15 milliGRAM(s) IV Push every 4 hours PRN Moderate Pain (4 - 6)  melatonin 3 milliGRAM(s) Oral at bedtime PRN Insomnia  ondansetron Injectable 4 milliGRAM(s) IV Push every 6 hours PRN Nausea        T(C): 36.5 (02-10-22 @ 03:50), Max: 39.2 (02-09-22 @ 22:45)  HR: 99 (02-10-22 @ 03:50) (82 - 125)  BP: 144/80 (02-10-22 @ 03:50) (134/63 - 148/94)  RR: 16 (02-10-22 @ 03:50) (16 - 20)  SpO2: 95% (02-10-22 @ 03:50) (94% - 97%)  Wt(kg): --    PHYSICAL EXAM:  CHEST/LUNG: Clear to percussion bilaterally; No rales, rhonchi, wheezing, or rubs  HEART: Regular rate and rhythm; No murmurs, rubs, or gallops  ABDOMEN: Soft, Nontender, Nondistended; Bowel sounds present  EXTREMITIES:  2+ Peripheral Pulses, No clubbing, cyanosis, or edema  SKIN: No rashes or lesions    CAPILLARY BLOOD GLUCOSE      POCT Blood Glucose.: 197 mg/dL (10 Feb 2022 11:25)  POCT Blood Glucose.: 214 mg/dL (10 Feb 2022 06:08)                            14.7   16.58 )-----------( 285      ( 10 Feb 2022 04:44 )             41.5       CMP:  02-10 @ 04:44  SGPT 28  Albumin 2.6   Alk Phos 80   Anion Gap 6   SGOT 20   Total Bili 2.3   BUN 7   Calcium Total 8.2   CO2 27   Chloride 102   Creatinine 0.79   eGFR if    eGFR if non AA 98   Glucose 199   Potassium 3.5   Protein 6.5   Sodium 135      Thyroid Function Tests:      Diabetes Tests:       Radiology:

## 2022-02-10 NOTE — PROGRESS NOTE ADULT - ASSESSMENT
61 yo male presenting to ER with perforated acute gallbladder.  Pt with complaints of RUQ discomfort.  White count elevated.  T bili rising, with normal alk phos and normal LFT.  61 yo male presenting to ER with perforated acute gallbladder.  Pt with complaints of RUQ discomfort.  White count elevated.  T bili rising, with normal alk phos and normal LFT.   Surg Att. Pt was seen and examined. Still c/o RUQ pain, but otherwise stable. Case discussed with IR for a possible perc drainage if no improvement in 24 hrs of antibiotics.

## 2022-02-10 NOTE — CONSULT NOTE ADULT - PROBLEM SELECTOR RECOMMENDATION 9
cont mod dose admelog corrective scale coverage q6hrs  goal bg 140-180 in icu setting  diabetes teaching greatly appreciated  further recommendations pending

## 2022-02-10 NOTE — CONSULT NOTE ADULT - SUBJECTIVE AND OBJECTIVE BOX
Patient is a 62y old  Male who presents with a chief complaint of Cholecystitis (09 Feb 2022 23:22)    via video : [id number]   Type:2 DX 7-8 years. a1c 10%. rx home: DSOUZA but was not taking for some time. New to insulin. has meter at home- diabetes education provided- educated son, wife and patient patho DM, insulin pen teaching x 1 hour. recommend insulin upon DSC.     HPI:  This is a 62y year old Male with no significant PMHx presenting with complaint of RUQ pain x3 days. Pt states pain started after he "ate some bad food" and began having a few hours after. Pain described as constant, sharp, stabbing RUQ radiating to the back. Admits to associated anorexia. Denies history of nausea/vomiting/diarrhea, chest pain, shortness of breath, urinary complaints.    (09 Feb 2022 22:34)      PAST MEDICAL & SURGICAL HISTORY:  No pertinent past medical history    No significant past surgical history        REVIEW OF SYSTEMS  General:	as above  Respiratory: NAD, No SOB, no cough  Cardiovascular: No chest pain, no palpitations	  Endocrine: no polyuria, no polydipsia, or S/S of hypoglycemia        Allergies    No Known Allergies    Intolerances        MEDICATIONS  (STANDING):  acetaminophen     Tablet .. 1000 milliGRAM(s) Oral every 6 hours  dextrose 40% Gel 15 Gram(s) Oral once  dextrose 5%. 1000 milliLiter(s) (50 mL/Hr) IV Continuous <Continuous>  dextrose 5%. 1000 milliLiter(s) (100 mL/Hr) IV Continuous <Continuous>  dextrose 50% Injectable 25 Gram(s) IV Push once  dextrose 50% Injectable 12.5 Gram(s) IV Push once  dextrose 50% Injectable 25 Gram(s) IV Push once  famotidine  Oral Tab/Cap - Peds 40 milliGRAM(s) Oral at bedtime  glucagon  Injectable 1 milliGRAM(s) IntraMuscular once  insulin lispro (ADMELOG) corrective regimen sliding scale   SubCutaneous every 6 hours  lisinopril 2.5 milliGRAM(s) Oral daily  piperacillin/tazobactam IVPB.. 3.375 Gram(s) IV Intermittent every 8 hours  simvastatin 5 milliGRAM(s) Oral at bedtime  sodium chloride 0.9%. 1000 milliLiter(s) (75 mL/Hr) IV Continuous <Continuous>

## 2022-02-10 NOTE — PROGRESS NOTE ADULT - SUBJECTIVE AND OBJECTIVE BOX
Hospital day:     62y Male admitted with Cholecystitis      Patient seen and examined bedside resting comfortably.  Pt seen with PA colleague, Rohith Joel who translated.  Currently with complaints of right upper abdominal pain.  States he has no fevers, chills, N/V.  Doing well otherwise.  No overnight events.     T(F): 97.7 (02-10-22 @ 03:50), Max: 102.5 (02-09-22 @ 22:45)  HR: 99 (02-10-22 @ 03:50) (82 - 125)  BP: 144/80 (02-10-22 @ 03:50) (134/63 - 148/94)  RR: 16 (02-10-22 @ 03:50) (16 - 20)  SpO2: 95% (02-10-22 @ 03:50) (94% - 97%)  Wt(kg): --  CAPILLARY BLOOD GLUCOSE      POCT Blood Glucose.: 197 mg/dL (10 Feb 2022 11:25)  POCT Blood Glucose.: 214 mg/dL (10 Feb 2022 06:08)      PHYSICAL EXAM:  General: NAD  Neuro:  Alert & oriented x 3  Abdomen: BS+ Soft.  + RUQ tenderness.    Extremities: no pedal edema or calf tenderness noted       LABS:                        14.7   16.58 )-----------( 285      ( 10 Feb 2022 04:44 )             41.5     02-10    135  |  102  |  7   ----------------------------<  199<H>  3.5   |  27  |  0.79    Ca    8.2<L>      10 Feb 2022 04:44    TPro  6.5  /  Alb  2.6<L>  /  TBili  2.3<H>  /  DBili  x   /  AST  20  /  ALT  28  /  AlkPhos  80  02-10    PT/INR - ( 09 Feb 2022 22:15 )   PT: 13.5 sec;   INR: 1.16 ratio         PTT - ( 09 Feb 2022 22:15 )  PTT:27.0 sec  I&O's Detail    09 Feb 2022 07:01  -  10 Feb 2022 07:00  --------------------------------------------------------  IN:    sodium chloride 0.9%: 225 mL  Total IN: 225 mL    OUT:    Voided (mL): 300 mL  Total OUT: 300 mL    Total NET: -75 mL            RADIOLOGY:

## 2022-02-10 NOTE — CONSULT NOTE ADULT - ASSESSMENT
Physical Exam:   Vital Signs Last 24 Hrs  T(C): 37.2 (10 Feb 2022 13:58), Max: 39.2 (09 Feb 2022 22:45)  T(F): 99 (10 Feb 2022 13:58), Max: 102.5 (09 Feb 2022 22:45)  HR: 76 (10 Feb 2022 13:58) (76 - 125)  BP: 104/62 (10 Feb 2022 13:58) (104/62 - 148/94)  BP(mean): --  RR: 16 (10 Feb 2022 13:58) (16 - 20)  SpO2: 95% (10 Feb 2022 13:58) (94% - 97%)    General: NAD, denies Fever, chills  CVS: S1S2 no M/R/G  Resp: CTA in all fields  : no freq, no urgency, no dysuria       eGFR if Non African American: 98 mL/min/1.73M2 (02-10-22 @ 04:44)  eGFR if : 113 mL/min/1.73M2 (02-10-22 @ 04:44)  eGFR if Non African American: 102 mL/min/1.73M2 (02-09-22 @ 20:03)  eGFR if African American: 118 mL/min/1.73M2 (02-09-22 @ 20:03)      CAPILLARY BLOOD GLUCOSE      POCT Blood Glucose.: 197 mg/dL (10 Feb 2022 11:25)  POCT Blood Glucose.: 214 mg/dL (10 Feb 2022 06:08)      Cholesterol, Serum: 113 mg/dL (05.19.21 @ 08:36)     HDL Cholesterol, Serum: 22 mg/dL (05.19.21 @ 08:36)     LDL Cholesterol Calculated: 66 mg/dL (05.19.21 @ 08:36)     DIET: CC  >50%

## 2022-02-11 ENCOUNTER — TRANSCRIPTION ENCOUNTER (OUTPATIENT)
Age: 62
End: 2022-02-11

## 2022-02-11 LAB
A1C WITH ESTIMATED AVERAGE GLUCOSE RESULT: 10.2 % — HIGH (ref 4–5.6)
ALBUMIN SERPL ELPH-MCNC: 2.4 G/DL — LOW (ref 3.3–5)
ALP SERPL-CCNC: 80 U/L — SIGNIFICANT CHANGE UP (ref 40–120)
ALT FLD-CCNC: 22 U/L — SIGNIFICANT CHANGE UP (ref 12–78)
ANION GAP SERPL CALC-SCNC: 8 MMOL/L — SIGNIFICANT CHANGE UP (ref 5–17)
AST SERPL-CCNC: 13 U/L — LOW (ref 15–37)
BILIRUB SERPL-MCNC: 1.4 MG/DL — HIGH (ref 0.2–1.2)
BUN SERPL-MCNC: 13 MG/DL — SIGNIFICANT CHANGE UP (ref 7–23)
CALCIUM SERPL-MCNC: 8.3 MG/DL — LOW (ref 8.5–10.1)
CHLORIDE SERPL-SCNC: 107 MMOL/L — SIGNIFICANT CHANGE UP (ref 96–108)
CO2 SERPL-SCNC: 24 MMOL/L — SIGNIFICANT CHANGE UP (ref 22–31)
CREAT SERPL-MCNC: 0.57 MG/DL — SIGNIFICANT CHANGE UP (ref 0.5–1.3)
ESTIMATED AVERAGE GLUCOSE: 246 MG/DL — HIGH (ref 68–114)
GLUCOSE SERPL-MCNC: 121 MG/DL — HIGH (ref 70–99)
HCT VFR BLD CALC: 39.8 % — SIGNIFICANT CHANGE UP (ref 39–50)
HGB BLD-MCNC: 13.8 G/DL — SIGNIFICANT CHANGE UP (ref 13–17)
MCHC RBC-ENTMCNC: 29.2 PG — SIGNIFICANT CHANGE UP (ref 27–34)
MCHC RBC-ENTMCNC: 34.7 GM/DL — SIGNIFICANT CHANGE UP (ref 32–36)
MCV RBC AUTO: 84.1 FL — SIGNIFICANT CHANGE UP (ref 80–100)
NRBC # BLD: 0 /100 WBCS — SIGNIFICANT CHANGE UP (ref 0–0)
PLATELET # BLD AUTO: 321 K/UL — SIGNIFICANT CHANGE UP (ref 150–400)
POTASSIUM SERPL-MCNC: 3.6 MMOL/L — SIGNIFICANT CHANGE UP (ref 3.5–5.3)
POTASSIUM SERPL-SCNC: 3.6 MMOL/L — SIGNIFICANT CHANGE UP (ref 3.5–5.3)
PROT SERPL-MCNC: 6.3 G/DL — SIGNIFICANT CHANGE UP (ref 6–8.3)
RBC # BLD: 4.73 M/UL — SIGNIFICANT CHANGE UP (ref 4.2–5.8)
RBC # FLD: 12.3 % — SIGNIFICANT CHANGE UP (ref 10.3–14.5)
SODIUM SERPL-SCNC: 139 MMOL/L — SIGNIFICANT CHANGE UP (ref 135–145)
WBC # BLD: 16.15 K/UL — HIGH (ref 3.8–10.5)
WBC # FLD AUTO: 16.15 K/UL — HIGH (ref 3.8–10.5)

## 2022-02-11 PROCEDURE — 99231 SBSQ HOSP IP/OBS SF/LOW 25: CPT

## 2022-02-11 PROCEDURE — 47490 INCISION OF GALLBLADDER: CPT

## 2022-02-11 PROCEDURE — 99232 SBSQ HOSP IP/OBS MODERATE 35: CPT

## 2022-02-11 RX ADMIN — PIPERACILLIN AND TAZOBACTAM 25 GRAM(S): 4; .5 INJECTION, POWDER, LYOPHILIZED, FOR SOLUTION INTRAVENOUS at 13:29

## 2022-02-11 RX ADMIN — PIPERACILLIN AND TAZOBACTAM 25 GRAM(S): 4; .5 INJECTION, POWDER, LYOPHILIZED, FOR SOLUTION INTRAVENOUS at 06:07

## 2022-02-11 RX ADMIN — SODIUM CHLORIDE 75 MILLILITER(S): 9 INJECTION INTRAMUSCULAR; INTRAVENOUS; SUBCUTANEOUS at 13:29

## 2022-02-11 RX ADMIN — Medication 1000 MILLIGRAM(S): at 11:47

## 2022-02-11 RX ADMIN — Medication 1000 MILLIGRAM(S): at 00:00

## 2022-02-11 RX ADMIN — Medication 15 MILLIGRAM(S): at 18:06

## 2022-02-11 RX ADMIN — Medication 15 MILLIGRAM(S): at 17:06

## 2022-02-11 RX ADMIN — SIMVASTATIN 5 MILLIGRAM(S): 20 TABLET, FILM COATED ORAL at 21:48

## 2022-02-11 RX ADMIN — PIPERACILLIN AND TAZOBACTAM 25 GRAM(S): 4; .5 INJECTION, POWDER, LYOPHILIZED, FOR SOLUTION INTRAVENOUS at 21:46

## 2022-02-11 RX ADMIN — Medication 1000 MILLIGRAM(S): at 12:37

## 2022-02-11 RX ADMIN — FAMOTIDINE 40 MILLIGRAM(S): 10 INJECTION INTRAVENOUS at 21:46

## 2022-02-11 RX ADMIN — Medication 4: at 22:16

## 2022-02-11 NOTE — DISCHARGE NOTE PROVIDER - CARE PROVIDER_API CALL
Keo Leyva)  Surgery  [address] [place] [address]  Phone: [phone]  Fax: [phone]  Follow Up Time:

## 2022-02-11 NOTE — PROGRESS NOTE ADULT - ASSESSMENT
61 yo m pmh HTN, HLD, type 2 DM presents with abdominal pain found to have Acute cholecystitis with suspected associated perforation, admit for further management

## 2022-02-11 NOTE — DISCHARGE NOTE PROVIDER - HOSPITAL COURSE
HPI:  This is a 62y year old Male with no significant PMHx presenting with complaint of RUQ pain x3 days. Pt states pain started after he "ate some bad food" and began having a few hours after. Pain described as constant, sharp, stabbing RUQ radiating to the back. Admits to associated anorexia. Denies history of nausea/vomiting/diarrhea, chest pain, shortness of breath, urinary complaints.    (09 Feb 2022 22:34)    In the ED:  Patient had a CT abdomen and pelvis which revealed acute cholecystitis with suspected associated perforation. WBC on admission was 16    Hospital course:  On day of admission, medicine was consulted for general medical management and optimization. Endocrinology and DM NP were consulted on Hospital Day #1 for management of uncontrolled DM2. On Hospital Day#2, patient went to IR for percutaneous cholecystostomy placement.     Throughout hospital stay, patient was continued on antibiotics, given pain control. Diet was advanced appropriately until return of normal GI function was obtained as evidence by passing of flatus and BM in addition to toleration of diet. Lab values were monitored with resolution of elevated Wc    Disposition: Patient to follow-up with Dr. Leyva as outpatient in 1 week.

## 2022-02-11 NOTE — PROGRESS NOTE ADULT - SUBJECTIVE AND OBJECTIVE BOX
Patient is a 62y old  Male who presents with a chief complaint of perforated gallbladder (2022 10:14)      INTERVAL HPI/OVERNIGHT EVENTS: Patient seen and examined at bedside. No overnight events occurred. Patient continues to complain of RUQ pain. Denies fevers, chills, headache, lightheadedness, chest pain, dyspnea,  n/v/d/c.    MEDICATIONS  (STANDING):  acetaminophen     Tablet .. 1000 milliGRAM(s) Oral every 6 hours  dextrose 40% Gel 15 Gram(s) Oral once  dextrose 5%. 1000 milliLiter(s) (50 mL/Hr) IV Continuous <Continuous>  dextrose 5%. 1000 milliLiter(s) (100 mL/Hr) IV Continuous <Continuous>  dextrose 50% Injectable 25 Gram(s) IV Push once  dextrose 50% Injectable 12.5 Gram(s) IV Push once  dextrose 50% Injectable 25 Gram(s) IV Push once  famotidine  Oral Tab/Cap - Peds 40 milliGRAM(s) Oral at bedtime  glucagon  Injectable 1 milliGRAM(s) IntraMuscular once  insulin lispro (ADMELOG) corrective regimen sliding scale   SubCutaneous every 6 hours  lisinopril 2.5 milliGRAM(s) Oral daily  piperacillin/tazobactam IVPB.. 3.375 Gram(s) IV Intermittent every 8 hours  simvastatin 5 milliGRAM(s) Oral at bedtime  sodium chloride 0.9%. 1000 milliLiter(s) (75 mL/Hr) IV Continuous <Continuous>    MEDICATIONS  (PRN):  ketorolac   Injectable 15 milliGRAM(s) IV Push every 4 hours PRN Moderate Pain (4 - 6)  melatonin 3 milliGRAM(s) Oral at bedtime PRN Insomnia  ondansetron Injectable 4 milliGRAM(s) IV Push every 6 hours PRN Nausea      Allergies    No Known Allergies    Intolerances        REVIEW OF SYSTEMS:  CONSTITUTIONAL: No fever or chills  HEENT:  No headache, no sore throat  RESPIRATORY: No cough, wheezing, or shortness of breath  CARDIOVASCULAR: No chest pain, palpitations  GASTROINTESTINAL: No abd pain, nausea, vomiting, or diarrhea  GENITOURINARY: No dysuria, frequency, or hematuria  NEUROLOGICAL: no focal weakness or dizziness  MUSCULOSKELETAL: no myalgias     Vital Signs Last 24 Hrs  T(C): 36.9 (2022 13:01), Max: 37.2 (10 Feb 2022 13:58)  T(F): 98.5 (2022 13:01), Max: 99 (10 Feb 2022 13:58)  HR: 80 (2022 13:) (76 - 81)  BP: 118/62 (2022 13:01) (104/62 - 140/78)  BP(mean): --  RR: 17 (2022 13:) (16 - 20)  SpO2: 94% (2022 13:) (94% - 96%)    PHYSICAL EXAM:  GENERAL: NAD  HEENT:  anicteric, moist mucous membranes  CHEST/LUNG:  CTA b/l, no rales, wheezes, or rhonchi  HEART:  RRR, S1, S2  ABDOMEN:  BS+, soft, nontender, nondistended  EXTREMITIES: no edema, cyanosis, or calf tenderness  NERVOUS SYSTEM: answers questions and follows commands appropriately    LABS:                        13.8   16.15 )-----------( 321      ( 2022 06:32 )             39.8     CBC Full  -  ( 2022 06:32 )  WBC Count : 16.15 K/uL  Hemoglobin : 13.8 g/dL  Hematocrit : 39.8 %  Platelet Count - Automated : 321 K/uL  Mean Cell Volume : 84.1 fl  Mean Cell Hemoglobin : 29.2 pg  Mean Cell Hemoglobin Concentration : 34.7 gm/dL  Auto Neutrophil # : x  Auto Lymphocyte # : x  Auto Monocyte # : x  Auto Eosinophil # : x  Auto Basophil # : x  Auto Neutrophil % : x  Auto Lymphocyte % : x  Auto Monocyte % : x  Auto Eosinophil % : x  Auto Basophil % : x    2022 06:32    139    |  107    |  13     ----------------------------<  121    3.6     |  24     |  0.57     Ca    8.3        2022 06:32    TPro  6.3    /  Alb  2.4    /  TBili  1.4    /  DBili  x      /  AST  13     /  ALT  22     /  AlkPhos  80     2022 06:32    PT/INR - ( 2022 22:15 )   PT: 13.5 sec;   INR: 1.16 ratio         PTT - ( 2022 22:15 )  PTT:27.0 sec  Urinalysis Basic - ( 2022 21:47 )    Color: Yellow / Appearance: Clear / S.015 / pH: x  Gluc: x / Ketone: Large  / Bili: Negative / Urobili: 1   Blood: x / Protein: 30 mg/dL / Nitrite: Negative   Leuk Esterase: Negative / RBC: 0-2 /HPF / WBC x   Sq Epi: x / Non Sq Epi: Occasional / Bacteria: Occasional      CAPILLARY BLOOD GLUCOSE      POCT Blood Glucose.: 111 mg/dL (2022 12:18)  POCT Blood Glucose.: 104 mg/dL (2022 05:57)  POCT Blood Glucose.: 120 mg/dL (10 Feb 2022 23:47)  POCT Blood Glucose.: 136 mg/dL (10 Feb 2022 17:10)        Culture - Blood (collected 02-10-22 @ 09:21)  Source: .Blood Blood-Peripheral  Preliminary Report (22 @ 10:01):    No growth to date.    Culture - Blood (collected 02-10-22 @ 09:21)  Source: .Blood Blood-Peripheral  Preliminary Report (22 @ 10:01):    No growth to date.    Culture - Urine (collected 02-10-22 @ 00:59)  Source: Clean Catch Clean Catch (Midstream)  Final Report (02-10-22 @ 22:48):    <10,000 CFU/mL Normal Urogenital Laisha        RADIOLOGY & ADDITIONAL TESTS:    Personally reviewed.     Consultant(s) Notes Reviewed:  [x] YES  [ ] NO     Patient is a 62y old  Male who presents with a chief complaint of perforated gallbladder (2022 10:14)      INTERVAL HPI/OVERNIGHT EVENTS: Patient seen and examined at bedside. No overnight events occurred. Patient continues to complain of RUQ pain. Denies fevers, chills, headache, lightheadedness, chest pain, dyspnea,  n/v/d/c.    MEDICATIONS  (STANDING):  acetaminophen     Tablet .. 1000 milliGRAM(s) Oral every 6 hours  dextrose 40% Gel 15 Gram(s) Oral once  dextrose 5%. 1000 milliLiter(s) (50 mL/Hr) IV Continuous <Continuous>  dextrose 5%. 1000 milliLiter(s) (100 mL/Hr) IV Continuous <Continuous>  dextrose 50% Injectable 25 Gram(s) IV Push once  dextrose 50% Injectable 12.5 Gram(s) IV Push once  dextrose 50% Injectable 25 Gram(s) IV Push once  famotidine  Oral Tab/Cap - Peds 40 milliGRAM(s) Oral at bedtime  glucagon  Injectable 1 milliGRAM(s) IntraMuscular once  insulin lispro (ADMELOG) corrective regimen sliding scale   SubCutaneous every 6 hours  lisinopril 2.5 milliGRAM(s) Oral daily  piperacillin/tazobactam IVPB.. 3.375 Gram(s) IV Intermittent every 8 hours  simvastatin 5 milliGRAM(s) Oral at bedtime  sodium chloride 0.9%. 1000 milliLiter(s) (75 mL/Hr) IV Continuous <Continuous>    MEDICATIONS  (PRN):  ketorolac   Injectable 15 milliGRAM(s) IV Push every 4 hours PRN Moderate Pain (4 - 6)  melatonin 3 milliGRAM(s) Oral at bedtime PRN Insomnia  ondansetron Injectable 4 milliGRAM(s) IV Push every 6 hours PRN Nausea      Allergies    No Known Allergies    Intolerances        REVIEW OF SYSTEMS:  CONSTITUTIONAL: No fever or chills  HEENT:  No headache, no sore throat  RESPIRATORY: No cough, wheezing, or shortness of breath  CARDIOVASCULAR: No chest pain, palpitations  GASTROINTESTINAL: admits abd pain, denies nausea, vomiting, or diarrhea  GENITOURINARY: No dysuria, frequency, or hematuria  NEUROLOGICAL: no focal weakness or dizziness  MUSCULOSKELETAL: no myalgias     Vital Signs Last 24 Hrs  T(C): 36.9 (2022 13:01), Max: 37.2 (10 Feb 2022 13:58)  T(F): 98.5 (2022 13:01), Max: 99 (10 Feb 2022 13:58)  HR: 80 (:) (76 - 81)  BP: 118/62 (2022 13:01) (104/62 - 140/78)  BP(mean): --  RR: 17 (2022 13:01) (16 - 20)  SpO2: 94% (2022 13:01) (94% - 96%)    PHYSICAL EXAM:  Gen: well appearing, NAD  HEENT: NCAT, PEERLA b/l, EOMI b/l, no conjunctival erythema  Cardio: regular rate and rhythm, +s1s2, no murmurs, rubs, or gallops  Pulm: CTA b/l, no wheezes, rales or rhonchi  Abdomen: soft, distended, tender to palpation RUQ on deep palpation, +BS x4 quadrants  Extremities: no clubbing, cyanosis or edema, +2 pedal pulses  Neuro: AAOx3  Skin: warm and dry    LABS:                        13.8   16.15 )-----------( 321      ( 2022 06:32 )             39.8     CBC Full  -  ( 2022 06:32 )  WBC Count : 16.15 K/uL  Hemoglobin : 13.8 g/dL  Hematocrit : 39.8 %  Platelet Count - Automated : 321 K/uL  Mean Cell Volume : 84.1 fl  Mean Cell Hemoglobin : 29.2 pg  Mean Cell Hemoglobin Concentration : 34.7 gm/dL  Auto Neutrophil # : x  Auto Lymphocyte # : x  Auto Monocyte # : x  Auto Eosinophil # : x  Auto Basophil # : x  Auto Neutrophil % : x  Auto Lymphocyte % : x  Auto Monocyte % : x  Auto Eosinophil % : x  Auto Basophil % : x    2022 06:32    139    |  107    |  13     ----------------------------<  121    3.6     |  24     |  0.57     Ca    8.3        2022 06:32    TPro  6.3    /  Alb  2.4    /  TBili  1.4    /  DBili  x      /  AST  13     /  ALT  22     /  AlkPhos  80     2022 06:32    PT/INR - ( 2022 22:15 )   PT: 13.5 sec;   INR: 1.16 ratio         PTT - ( 2022 22:15 )  PTT:27.0 sec  Urinalysis Basic - ( 2022 21:47 )    Color: Yellow / Appearance: Clear / S.015 / pH: x  Gluc: x / Ketone: Large  / Bili: Negative / Urobili: 1   Blood: x / Protein: 30 mg/dL / Nitrite: Negative   Leuk Esterase: Negative / RBC: 0-2 /HPF / WBC x   Sq Epi: x / Non Sq Epi: Occasional / Bacteria: Occasional      CAPILLARY BLOOD GLUCOSE      POCT Blood Glucose.: 111 mg/dL (2022 12:18)  POCT Blood Glucose.: 104 mg/dL (2022 05:57)  POCT Blood Glucose.: 120 mg/dL (10 Feb 2022 23:47)  POCT Blood Glucose.: 136 mg/dL (10 Feb 2022 17:10)        Culture - Blood (collected 02-10-22 @ 09:21)  Source: .Blood Blood-Peripheral  Preliminary Report (22 @ 10:01):    No growth to date.    Culture - Blood (collected 02-10-22 @ 09:21)  Source: .Blood Blood-Peripheral  Preliminary Report (22 @ 10:01):    No growth to date.    Culture - Urine (collected 02-10-22 @ 00:59)  Source: Clean Catch Clean Catch (Midstream)  Final Report (02-10-22 @ 22:48):    <10,000 CFU/mL Normal Urogenital Laisha        RADIOLOGY & ADDITIONAL TESTS:    Personally reviewed.     Consultant(s) Notes Reviewed:  [x] YES  [ ] NO

## 2022-02-11 NOTE — DISCHARGE NOTE PROVIDER - NSDCMRMEDTOKEN_GEN_ALL_CORE_FT
famotidine 40 mg oral tablet: 1 tab(s) orally once a day (at bedtime)  glipiZIDE 5 mg oral tablet: 1 tab(s) orally once a day  lisinopril 2.5 mg oral tablet: 1 tab(s) orally once a day  simvastatin 5 mg oral tablet: 1 tab(s) orally once a day (at bedtime)   cefpodoxime 200 mg oral tablet: 1 tab(s) orally every 12 hours MDD:2  famotidine 40 mg oral tablet: 1 tab(s) orally once a day (at bedtime)  glipiZIDE 5 mg oral tablet: 1 tab(s) orally once a day  lisinopril 2.5 mg oral tablet: 1 tab(s) orally once a day  metroNIDAZOLE 500 mg oral tablet: 1 tab(s) orally 3 times a day MDD:3  simvastatin 5 mg oral tablet: 1 tab(s) orally once a day (at bedtime)

## 2022-02-11 NOTE — PROGRESS NOTE ADULT - ASSESSMENT
62y Male p/w perforated gallbladder, +leukocytosis (WBC remains elevated at 16k) 62y Male p/w perforated gallbladder, +leukocytosis (WBC remains elevated at 16k)  Since pt has persistent pain and wbc will proceed with perc drainage.

## 2022-02-11 NOTE — DISCHARGE NOTE PROVIDER - NSDCHHNEEDSERVICEOTHER_GEN_ALL_CORE_FT
Percutaneous cholecystostomy care- Please flush catheter with 3cc of saline every other day and perform dressing change and wound check.

## 2022-02-11 NOTE — DIETITIAN INITIAL EVALUATION ADULT. - OTHER INFO
63 yo m pmh HTN, HLD, type 2 DM presents with abdominal pain found to have Acute cholecystitis with suspected associated perforation, admit for further management.    Pt NPO for possible procedure, cholecystostomy tube?  A1c indicates poor Glu control.  Seen by DM NP. Pt new to insulin.

## 2022-02-11 NOTE — DIETITIAN INITIAL EVALUATION ADULT. - PROBLEM SELECTOR PLAN 1
-Discussed with Dr. Leyva  -admit to surgery  -Will d/w IR for poss drainage vs OR for lap cathleen  -K repleted  -Medicine consulted  -NPO, IVF, supportive care  -IV antibiotics started    Surgical Team Contact Information  Spectralink: Ext: [phone] or [phone]  Pager: [phone]

## 2022-02-11 NOTE — PROGRESS NOTE ADULT - SUBJECTIVE AND OBJECTIVE BOX
SUBJECTIVE: Patient seen and examined at bedside. Patient admits to intermittent RUQ abdominal pain and occasional nausea w/o vomiting. Currently NPO. Admits to passing flatus and BM.     Vital Signs Last 24 Hrs  T(C): 36.6 (2022 05:54), Max: 37.2 (10 Feb 2022 13:58)  T(F): 97.9 (2022 05:54), Max: 99 (10 Feb 2022 13:58)  HR: 78 (2022 05:54) (76 - 81)  BP: 129/76 (2022 05:54) (104/62 - 140/78)  BP(mean): --  RR: 16 (2022 05:54) (16 - 20)  SpO2: 94% (2022 05:54) (94% - 96%)    PHYSICAL EXAM:  GENERAL: No acute distress, well-developed  HEAD:  Atraumatic, Normocephalic  ABDOMEN: Soft, non-distended, +ttp in RUQ  NEUROLOGY: A&O x 3, no focal deficits    I&O's Summary    10 Feb 2022 07:01  -  2022 07:00  --------------------------------------------------------  IN: 1700 mL / OUT: 400 mL / NET: 1300 mL      MEDICATIONS  (STANDING):  acetaminophen     Tablet .. 1000 milliGRAM(s) Oral every 6 hours  dextrose 40% Gel 15 Gram(s) Oral once  dextrose 5%. 1000 milliLiter(s) (50 mL/Hr) IV Continuous <Continuous>  dextrose 5%. 1000 milliLiter(s) (100 mL/Hr) IV Continuous <Continuous>  dextrose 50% Injectable 25 Gram(s) IV Push once  dextrose 50% Injectable 12.5 Gram(s) IV Push once  dextrose 50% Injectable 25 Gram(s) IV Push once  famotidine  Oral Tab/Cap - Peds 40 milliGRAM(s) Oral at bedtime  glucagon  Injectable 1 milliGRAM(s) IntraMuscular once  insulin lispro (ADMELOG) corrective regimen sliding scale   SubCutaneous every 6 hours  lisinopril 2.5 milliGRAM(s) Oral daily  piperacillin/tazobactam IVPB.. 3.375 Gram(s) IV Intermittent every 8 hours  simvastatin 5 milliGRAM(s) Oral at bedtime  sodium chloride 0.9%. 1000 milliLiter(s) (75 mL/Hr) IV Continuous <Continuous>    MEDICATIONS  (PRN):  ketorolac   Injectable 15 milliGRAM(s) IV Push every 4 hours PRN Moderate Pain (4 - 6)  melatonin 3 milliGRAM(s) Oral at bedtime PRN Insomnia  ondansetron Injectable 4 milliGRAM(s) IV Push every 6 hours PRN Nausea    LABS:                        13.8   16.15 )-----------( 321      ( 2022 06:32 )             39.8     02-11    139  |  107  |  13  ----------------------------<  121<H>  3.6   |  24  |  0.57    Ca    8.3<L>      2022 06:32    TPro  6.3  /  Alb  2.4<L>  /  TBili  1.4<H>  /  DBili  x   /  AST  13<L>  /  ALT  22  /  AlkPhos  80  02-11    PT/INR - ( 2022 22:15 )   PT: 13.5 sec;   INR: 1.16 ratio       PTT - ( 2022 22:15 )  PTT:27.0 sec  Urinalysis Basic - ( 2022 21:47 )    Color: Yellow / Appearance: Clear / S.015 / pH: x  Gluc: x / Ketone: Large  / Bili: Negative / Urobili: 1   Blood: x / Protein: 30 mg/dL / Nitrite: Negative   Leuk Esterase: Negative / RBC: 0-2 /HPF / WBC x   Sq Epi: x / Non Sq Epi: Occasional / Bacteria: Occasional

## 2022-02-11 NOTE — DISCHARGE NOTE PROVIDER - NSDCFUADDINST_GEN_ALL_CORE_FT
Recommend every other day flush of catheter with 3cc saline, as well as dressing change and wound check.  Recommend every other day flush of catheter with 3cc saline, as well as dressing change and wound check.     Please Call Dr. Leyva's office to make follow-up appointment

## 2022-02-12 LAB
ALBUMIN SERPL ELPH-MCNC: 2.1 G/DL — LOW (ref 3.3–5)
ALP SERPL-CCNC: 78 U/L — SIGNIFICANT CHANGE UP (ref 40–120)
ALT FLD-CCNC: 19 U/L — SIGNIFICANT CHANGE UP (ref 12–78)
ANION GAP SERPL CALC-SCNC: 9 MMOL/L — SIGNIFICANT CHANGE UP (ref 5–17)
AST SERPL-CCNC: 16 U/L — SIGNIFICANT CHANGE UP (ref 15–37)
BASOPHILS # BLD AUTO: 0.04 K/UL — SIGNIFICANT CHANGE UP (ref 0–0.2)
BASOPHILS NFR BLD AUTO: 0.4 % — SIGNIFICANT CHANGE UP (ref 0–2)
BILIRUB SERPL-MCNC: 1 MG/DL — SIGNIFICANT CHANGE UP (ref 0.2–1.2)
BUN SERPL-MCNC: 5 MG/DL — LOW (ref 7–23)
CALCIUM SERPL-MCNC: 7.9 MG/DL — LOW (ref 8.5–10.1)
CHLORIDE SERPL-SCNC: 105 MMOL/L — SIGNIFICANT CHANGE UP (ref 96–108)
CO2 SERPL-SCNC: 22 MMOL/L — SIGNIFICANT CHANGE UP (ref 22–31)
CREAT SERPL-MCNC: 0.58 MG/DL — SIGNIFICANT CHANGE UP (ref 0.5–1.3)
EOSINOPHIL # BLD AUTO: 0.46 K/UL — SIGNIFICANT CHANGE UP (ref 0–0.5)
EOSINOPHIL NFR BLD AUTO: 4.5 % — SIGNIFICANT CHANGE UP (ref 0–6)
GLUCOSE SERPL-MCNC: 227 MG/DL — HIGH (ref 70–99)
HCT VFR BLD CALC: 38.1 % — LOW (ref 39–50)
HGB BLD-MCNC: 13.4 G/DL — SIGNIFICANT CHANGE UP (ref 13–17)
IMM GRANULOCYTES NFR BLD AUTO: 1.8 % — HIGH (ref 0–1.5)
LYMPHOCYTES # BLD AUTO: 19.9 % — SIGNIFICANT CHANGE UP (ref 13–44)
LYMPHOCYTES # BLD AUTO: 2.02 K/UL — SIGNIFICANT CHANGE UP (ref 1–3.3)
MCHC RBC-ENTMCNC: 28.6 PG — SIGNIFICANT CHANGE UP (ref 27–34)
MCHC RBC-ENTMCNC: 35.2 GM/DL — SIGNIFICANT CHANGE UP (ref 32–36)
MCV RBC AUTO: 81.4 FL — SIGNIFICANT CHANGE UP (ref 80–100)
MONOCYTES # BLD AUTO: 0.93 K/UL — HIGH (ref 0–0.9)
MONOCYTES NFR BLD AUTO: 9.2 % — SIGNIFICANT CHANGE UP (ref 2–14)
NEUTROPHILS # BLD AUTO: 6.5 K/UL — SIGNIFICANT CHANGE UP (ref 1.8–7.4)
NEUTROPHILS NFR BLD AUTO: 64.2 % — SIGNIFICANT CHANGE UP (ref 43–77)
NRBC # BLD: 0 /100 WBCS — SIGNIFICANT CHANGE UP (ref 0–0)
PLATELET # BLD AUTO: 336 K/UL — SIGNIFICANT CHANGE UP (ref 150–400)
POTASSIUM SERPL-MCNC: 3.1 MMOL/L — LOW (ref 3.5–5.3)
POTASSIUM SERPL-SCNC: 3.1 MMOL/L — LOW (ref 3.5–5.3)
PROT SERPL-MCNC: 5.9 G/DL — LOW (ref 6–8.3)
RBC # BLD: 4.68 M/UL — SIGNIFICANT CHANGE UP (ref 4.2–5.8)
RBC # FLD: 12.4 % — SIGNIFICANT CHANGE UP (ref 10.3–14.5)
SODIUM SERPL-SCNC: 136 MMOL/L — SIGNIFICANT CHANGE UP (ref 135–145)
WBC # BLD: 10.13 K/UL — SIGNIFICANT CHANGE UP (ref 3.8–10.5)
WBC # FLD AUTO: 10.13 K/UL — SIGNIFICANT CHANGE UP (ref 3.8–10.5)

## 2022-02-12 PROCEDURE — 99232 SBSQ HOSP IP/OBS MODERATE 35: CPT

## 2022-02-12 PROCEDURE — 99231 SBSQ HOSP IP/OBS SF/LOW 25: CPT

## 2022-02-12 RX ORDER — KETOROLAC TROMETHAMINE 30 MG/ML
15 SYRINGE (ML) INJECTION EVERY 4 HOURS
Refills: 0 | Status: DISCONTINUED | OUTPATIENT
Start: 2022-02-12 | End: 2022-02-12

## 2022-02-12 RX ADMIN — PIPERACILLIN AND TAZOBACTAM 25 GRAM(S): 4; .5 INJECTION, POWDER, LYOPHILIZED, FOR SOLUTION INTRAVENOUS at 14:44

## 2022-02-12 RX ADMIN — SODIUM CHLORIDE 75 MILLILITER(S): 9 INJECTION INTRAMUSCULAR; INTRAVENOUS; SUBCUTANEOUS at 03:30

## 2022-02-12 RX ADMIN — Medication 2: at 23:00

## 2022-02-12 RX ADMIN — Medication 4: at 08:08

## 2022-02-12 RX ADMIN — LISINOPRIL 2.5 MILLIGRAM(S): 2.5 TABLET ORAL at 06:09

## 2022-02-12 RX ADMIN — Medication 2: at 17:38

## 2022-02-12 RX ADMIN — PIPERACILLIN AND TAZOBACTAM 25 GRAM(S): 4; .5 INJECTION, POWDER, LYOPHILIZED, FOR SOLUTION INTRAVENOUS at 06:08

## 2022-02-12 RX ADMIN — Medication 1000 MILLIGRAM(S): at 23:52

## 2022-02-12 RX ADMIN — SIMVASTATIN 5 MILLIGRAM(S): 20 TABLET, FILM COATED ORAL at 22:51

## 2022-02-12 RX ADMIN — Medication 1000 MILLIGRAM(S): at 07:05

## 2022-02-12 RX ADMIN — PIPERACILLIN AND TAZOBACTAM 25 GRAM(S): 4; .5 INJECTION, POWDER, LYOPHILIZED, FOR SOLUTION INTRAVENOUS at 22:51

## 2022-02-12 RX ADMIN — Medication 1000 MILLIGRAM(S): at 06:10

## 2022-02-12 RX ADMIN — FAMOTIDINE 40 MILLIGRAM(S): 10 INJECTION INTRAVENOUS at 22:51

## 2022-02-12 RX ADMIN — Medication 1000 MILLIGRAM(S): at 17:38

## 2022-02-12 RX ADMIN — Medication 1000 MILLIGRAM(S): at 22:52

## 2022-02-12 RX ADMIN — Medication 1000 MILLIGRAM(S): at 12:39

## 2022-02-12 RX ADMIN — Medication 4: at 12:39

## 2022-02-12 NOTE — PROGRESS NOTE ADULT - SUBJECTIVE AND OBJECTIVE BOX
CAPILLARY BLOOD GLUCOSE      POCT Blood Glucose.: 246 mg/dL (12 Feb 2022 12:20)  POCT Blood Glucose.: 207 mg/dL (12 Feb 2022 07:50)  POCT Blood Glucose.: 222 mg/dL (11 Feb 2022 21:56)  POCT Blood Glucose.: 115 mg/dL (11 Feb 2022 17:33)      Vital Signs Last 24 Hrs  T(C): 36.5 (12 Feb 2022 12:30), Max: 37.3 (11 Feb 2022 23:52)  T(F): 97.7 (12 Feb 2022 12:30), Max: 99.1 (11 Feb 2022 23:52)  HR: 85 (12 Feb 2022 12:30) (74 - 87)  BP: 113/61 (12 Feb 2022 12:30) (113/61 - 147/75)  BP(mean): --  RR: 17 (12 Feb 2022 12:30) (17 - 18)  SpO2: 95% (12 Feb 2022 12:30) (92% - 99%)    General: WN/WD NAD  Respiratory: CTA B/L  CV: RRR, S1S2, no murmurs, rubs or gallops  Abdominal: Soft, NT, ND +BS, Last BM  Extremities: No edema, + peripheral pulses     02-12    136  |  105  |  5<L>  ----------------------------<  227<H>  3.1<L>   |  22  |  0.58    Ca    7.9<L>      12 Feb 2022 09:09    TPro  5.9<L>  /  Alb  2.1<L>  /  TBili  1.0  /  DBili  x   /  AST  16  /  ALT  19  /  AlkPhos  78  02-12      dextrose 40% Gel 15 Gram(s) Oral once  dextrose 50% Injectable 25 Gram(s) IV Push once  dextrose 50% Injectable 12.5 Gram(s) IV Push once  dextrose 50% Injectable 25 Gram(s) IV Push once  glucagon  Injectable 1 milliGRAM(s) IntraMuscular once  insulin lispro (ADMELOG) corrective regimen sliding scale   SubCutaneous every 6 hours  simvastatin 5 milliGRAM(s) Oral at bedtime

## 2022-02-12 NOTE — PROGRESS NOTE ADULT - SUBJECTIVE AND OBJECTIVE BOX
SUBJECTIVE:  Patient seen and examined at bedside.  No overnight events.  Patient with no new complaints at this time, tolerating clear diet.  Patient denies any fevers, chills, chest pain, shortness of breath, nausea, vomiting or diarrhea.    Vital Signs Last 24 Hrs  T(C): 37.1 (12 Feb 2022 04:48), Max: 37.3 (11 Feb 2022 23:52)  T(F): 98.8 (12 Feb 2022 04:48), Max: 99.1 (11 Feb 2022 23:52)  HR: 78 (12 Feb 2022 04:48) (74 - 87)  BP: 115/69 (12 Feb 2022 04:48) (115/69 - 147/75)  BP(mean): --  RR: 18 (12 Feb 2022 04:48) (17 - 18)  SpO2: 92% (12 Feb 2022 04:48) (92% - 99%)    PHYSICAL EXAM:  GENERAL: No acute distress, well-developed  HEAD:  Atraumatic, Normocephalic  ABDOMEN: Soft, mildly-tender RUQ, mildly-distended perc drain with bilious drainage  NEUROLOGY: A&O x 3, no focal deficits    I&O's Summary    10 Feb 2022 07:01  -  11 Feb 2022 07:00  --------------------------------------------------------  IN: 1700 mL / OUT: 400 mL / NET: 1300 mL    11 Feb 2022 07:01  -  12 Feb 2022 06:46  --------------------------------------------------------  IN: 0 mL / OUT: 550 mL / NET: -550 mL      I&O's Detail    10 Feb 2022 07:01  -  11 Feb 2022 07:00  --------------------------------------------------------  IN:    IV PiggyBack: 200 mL    sodium chloride 0.9%: 1500 mL  Total IN: 1700 mL    OUT:    Voided (mL): 400 mL  Total OUT: 400 mL    Total NET: 1300 mL      11 Feb 2022 07:01  -  12 Feb 2022 06:46  --------------------------------------------------------  IN:  Total IN: 0 mL    OUT:    Other (mL): 50 mL    Voided (mL): 500 mL  Total OUT: 550 mL    Total NET: -550 mL        MEDICATIONS  (STANDING):  acetaminophen     Tablet .. 1000 milliGRAM(s) Oral every 6 hours  dextrose 40% Gel 15 Gram(s) Oral once  dextrose 5%. 1000 milliLiter(s) (50 mL/Hr) IV Continuous <Continuous>  dextrose 5%. 1000 milliLiter(s) (100 mL/Hr) IV Continuous <Continuous>  dextrose 50% Injectable 25 Gram(s) IV Push once  dextrose 50% Injectable 12.5 Gram(s) IV Push once  dextrose 50% Injectable 25 Gram(s) IV Push once  famotidine  Oral Tab/Cap - Peds 40 milliGRAM(s) Oral at bedtime  glucagon  Injectable 1 milliGRAM(s) IntraMuscular once  insulin lispro (ADMELOG) corrective regimen sliding scale   SubCutaneous every 6 hours  lisinopril 2.5 milliGRAM(s) Oral daily  piperacillin/tazobactam IVPB.. 3.375 Gram(s) IV Intermittent every 8 hours  simvastatin 5 milliGRAM(s) Oral at bedtime  sodium chloride 0.9%. 1000 milliLiter(s) (75 mL/Hr) IV Continuous <Continuous>    MEDICATIONS  (PRN):  ketorolac   Injectable 15 milliGRAM(s) IV Push every 4 hours PRN Moderate Pain (4 - 6)  melatonin 3 milliGRAM(s) Oral at bedtime PRN Insomnia  ondansetron Injectable 4 milliGRAM(s) IV Push every 6 hours PRN Nausea    LABS:                        13.8   16.15 )-----------( 321      ( 11 Feb 2022 06:32 )             39.8     02-11    139  |  107  |  13  ----------------------------<  121<H>  3.6   |  24  |  0.57    Ca    8.3<L>      11 Feb 2022 06:32    TPro  6.3  /  Alb  2.4<L>  /  TBili  1.4<H>  /  DBili  x   /  AST  13<L>  /  ALT  22  /  AlkPhos  80  02-11        RADIOLOGY & ADDITIONAL STUDIES: SUBJECTIVE:  Patient seen and examined at bedside.  No overnight events.  Patient with no new complaints at this time, tolerating clear diet.  Patient denies any fevers, chills, chest pain, shortness of breath, nausea, vomiting or diarrhea.      Vital Signs Last 24 Hrs  T(C): 37.1 (12 Feb 2022 04:48), Max: 37.3 (11 Feb 2022 23:52)  T(F): 98.8 (12 Feb 2022 04:48), Max: 99.1 (11 Feb 2022 23:52)  HR: 78 (12 Feb 2022 04:48) (74 - 87)  BP: 115/69 (12 Feb 2022 04:48) (115/69 - 147/75)  RR: 18 (12 Feb 2022 04:48) (17 - 18)  SpO2: 92% (12 Feb 2022 04:48) (92% - 99%)      PHYSICAL EXAM:  GENERAL: No acute distress, well-developed  HEAD:  Atraumatic, Normocephalic  ABDOMEN: Soft, mildly-tender RUQ, mildly-distended perc drain with bilious drainage  NEUROLOGY: A&O x 3, no focal deficits      I&O's Summary    10 Feb 2022 07:01  -  11 Feb 2022 07:00  --------------------------------------------------------  IN: 1700 mL / OUT: 400 mL / NET: 1300 mL    11 Feb 2022 07:01  -  12 Feb 2022 06:46  --------------------------------------------------------  IN: 0 mL / OUT: 550 mL / NET: -550 mL      I&O's Detail    10 Feb 2022 07:01  -  11 Feb 2022 07:00  --------------------------------------------------------  IN:    IV PiggyBack: 200 mL    sodium chloride 0.9%: 1500 mL  Total IN: 1700 mL    OUT:    Voided (mL): 400 mL  Total OUT: 400 mL    Total NET: 1300 mL      11 Feb 2022 07:01  -  12 Feb 2022 06:46  --------------------------------------------------------  IN:  Total IN: 0 mL    OUT:    Other (mL): 50 mL    Voided (mL): 500 mL  Total OUT: 550 mL    Total NET: -550 mL      MEDICATIONS  (STANDING):  acetaminophen     Tablet .. 1000 milliGRAM(s) Oral every 6 hours  dextrose 40% Gel 15 Gram(s) Oral once  dextrose 5%. 1000 milliLiter(s) (50 mL/Hr) IV Continuous <Continuous>  dextrose 5%. 1000 milliLiter(s) (100 mL/Hr) IV Continuous <Continuous>  dextrose 50% Injectable 25 Gram(s) IV Push once  dextrose 50% Injectable 12.5 Gram(s) IV Push once  dextrose 50% Injectable 25 Gram(s) IV Push once  famotidine  Oral Tab/Cap - Peds 40 milliGRAM(s) Oral at bedtime  glucagon  Injectable 1 milliGRAM(s) IntraMuscular once  insulin lispro (ADMELOG) corrective regimen sliding scale   SubCutaneous every 6 hours  lisinopril 2.5 milliGRAM(s) Oral daily  piperacillin/tazobactam IVPB.. 3.375 Gram(s) IV Intermittent every 8 hours  simvastatin 5 milliGRAM(s) Oral at bedtime  sodium chloride 0.9%. 1000 milliLiter(s) (75 mL/Hr) IV Continuous <Continuous>      MEDICATIONS  (PRN):  ketorolac   Injectable 15 milliGRAM(s) IV Push every 4 hours PRN Moderate Pain (4 - 6)  melatonin 3 milliGRAM(s) Oral at bedtime PRN Insomnia  ondansetron Injectable 4 milliGRAM(s) IV Push every 6 hours PRN Nausea      LABS:                        13.8   16.15 )-----------( 321      ( 11 Feb 2022 06:32 )             39.8     02-11    139  |  107  |  13  ----------------------------<  121<H>  3.6   |  24  |  0.57    Ca    8.3<L>      11 Feb 2022 06:32    TPro  6.3  /  Alb  2.4<L>  /  TBili  1.4<H>  /  DBili  x   /  AST  13<L>  /  ALT  22  /  AlkPhos  80  02-11      RADIOLOGY & ADDITIONAL STUDIES:

## 2022-02-12 NOTE — PROGRESS NOTE ADULT - PROBLEM SELECTOR PLAN 5
can start chemical dvt ppx when cleared by surgery     Family updated by surgical team
hold chemical ppx in prep for IR drainage     Family updated by surgical team
hold chemical ppx in prep for possible IR drainage in AM    Family updated by surgical team 2/10

## 2022-02-12 NOTE — PROGRESS NOTE ADULT - PROBLEM SELECTOR PLAN 2
continue with home medication Lisinopril 2.5mg with hold parameters

## 2022-02-12 NOTE — PROGRESS NOTE ADULT - PROBLEM SELECTOR PLAN 4
Chronic, known history of T2DM  - Hold home oral meds  - moderate dose insulin sliding scale   - Hypoglycemia protocol, fingerstick glucose QAC&HS   - NPO for OR tomm, then switch to consistent carb/DASH diet after procedure   - A1c 10  - Dr. Perlman following
Chronic, known history of T2DM  - Hold home oral meds  - moderate dose insulin sliding scale   - Hypoglycemia protocol, fingerstick glucose QAC&HS   - consistent carb/DASH diet after procedure   - A1c 10  - Dr. Perlman following  - would have patient follow up with dr. perlman on discharge
Chronic, known history of T2DM  - Hold home oral meds  - moderate dose insulin sliding scale   - Hypoglycemia protocol, fingerstick glucose QAC&HS   - consistent carb/DASH diet after procedure   - A1c 10  - Dr. Perlman following

## 2022-02-12 NOTE — ANESTHESIA FOLLOW-UP NOTE - NSEVALATIONFT_GEN_ALL_CORE
Patient not seen due to risk of COVID.  Patient electronic chart, notes, labs vitals reviewed  No apparent anesthesia related issues at this time

## 2022-02-12 NOTE — PROGRESS NOTE ADULT - SUBJECTIVE AND OBJECTIVE BOX
Patient is a 62y old  Male who presents with a chief complaint of RUQ pain (11 Feb 2022 14:56)      INTERVAL HPI/OVERNIGHT EVENTS: Patient seen and examined at bedside. No overnight events occurred. POD1 s/p perc drain.  Patient continues to complain of RUQ pain. Denies fevers, chills, headache, lightheadedness, chest pain, dyspnea, abdominal pain, n/v/d/c.    MEDICATIONS  (STANDING):  acetaminophen     Tablet .. 1000 milliGRAM(s) Oral every 6 hours  dextrose 40% Gel 15 Gram(s) Oral once  dextrose 5%. 1000 milliLiter(s) (50 mL/Hr) IV Continuous <Continuous>  dextrose 5%. 1000 milliLiter(s) (100 mL/Hr) IV Continuous <Continuous>  dextrose 50% Injectable 25 Gram(s) IV Push once  dextrose 50% Injectable 12.5 Gram(s) IV Push once  dextrose 50% Injectable 25 Gram(s) IV Push once  famotidine  Oral Tab/Cap - Peds 40 milliGRAM(s) Oral at bedtime  glucagon  Injectable 1 milliGRAM(s) IntraMuscular once  insulin lispro (ADMELOG) corrective regimen sliding scale   SubCutaneous every 6 hours  lisinopril 2.5 milliGRAM(s) Oral daily  piperacillin/tazobactam IVPB.. 3.375 Gram(s) IV Intermittent every 8 hours  simvastatin 5 milliGRAM(s) Oral at bedtime  sodium chloride 0.9%. 1000 milliLiter(s) (75 mL/Hr) IV Continuous <Continuous>    MEDICATIONS  (PRN):  ketorolac   Injectable 15 milliGRAM(s) IV Push every 4 hours PRN Moderate Pain (4 - 6)  melatonin 3 milliGRAM(s) Oral at bedtime PRN Insomnia  ondansetron Injectable 4 milliGRAM(s) IV Push every 6 hours PRN Nausea      Allergies    No Known Allergies    Intolerances        REVIEW OF SYSTEMS:  CONSTITUTIONAL: No fever or chills  HEENT:  No headache, no sore throat  RESPIRATORY: No cough, wheezing, or shortness of breath  CARDIOVASCULAR: No chest pain, palpitations  GASTROINTESTINAL: admits RUQ abd pain, denies nausea, vomiting, or diarrhea  GENITOURINARY: No dysuria, frequency, or hematuria  NEUROLOGICAL: no focal weakness or dizziness  MUSCULOSKELETAL: no myalgias     Vital Signs Last 24 Hrs  T(C): 37.1 (12 Feb 2022 04:48), Max: 37.3 (11 Feb 2022 23:52)  T(F): 98.8 (12 Feb 2022 04:48), Max: 99.1 (11 Feb 2022 23:52)  HR: 78 (12 Feb 2022 04:48) (74 - 87)  BP: 115/69 (12 Feb 2022 04:48) (115/69 - 147/75)  BP(mean): --  RR: 18 (12 Feb 2022 04:48) (17 - 18)  SpO2: 92% (12 Feb 2022 04:48) (92% - 99%)    PHYSICAL EXAM:  GENERAL: NAD  HEENT:  anicteric, moist mucous membranes  CHEST/LUNG:  CTA b/l, no rales, wheezes, or rhonchi  HEART:  RRR, S1, S2  ABDOMEN:  BS+, soft, tender RUQ around drain, drain in place draining brown liquid, nondistended  EXTREMITIES: no edema, cyanosis, or calf tenderness  NERVOUS SYSTEM: answers questions and follows commands appropriately    LABS:                        13.4   10.13 )-----------( 336      ( 12 Feb 2022 09:09 )             38.1     CBC Full  -  ( 12 Feb 2022 09:09 )  WBC Count : 10.13 K/uL  Hemoglobin : 13.4 g/dL  Hematocrit : 38.1 %  Platelet Count - Automated : 336 K/uL  Mean Cell Volume : 81.4 fl  Mean Cell Hemoglobin : 28.6 pg  Mean Cell Hemoglobin Concentration : 35.2 gm/dL  Auto Neutrophil # : 6.50 K/uL  Auto Lymphocyte # : 2.02 K/uL  Auto Monocyte # : 0.93 K/uL  Auto Eosinophil # : 0.46 K/uL  Auto Basophil # : 0.04 K/uL  Auto Neutrophil % : 64.2 %  Auto Lymphocyte % : 19.9 %  Auto Monocyte % : 9.2 %  Auto Eosinophil % : 4.5 %  Auto Basophil % : 0.4 %    12 Feb 2022 09:09    136    |  105    |  5      ----------------------------<  227    3.1     |  22     |  0.58     Ca    7.9        12 Feb 2022 09:09    TPro  5.9    /  Alb  2.1    /  TBili  1.0    /  DBili  x      /  AST  16     /  ALT  19     /  AlkPhos  78     12 Feb 2022 09:09        CAPILLARY BLOOD GLUCOSE      POCT Blood Glucose.: 207 mg/dL (12 Feb 2022 07:50)  POCT Blood Glucose.: 222 mg/dL (11 Feb 2022 21:56)  POCT Blood Glucose.: 115 mg/dL (11 Feb 2022 17:33)  POCT Blood Glucose.: 111 mg/dL (11 Feb 2022 12:18)        Culture - Blood (collected 02-10-22 @ 09:21)  Source: .Blood Blood-Peripheral  Preliminary Report (02-11-22 @ 10:01):    No growth to date.    Culture - Blood (collected 02-10-22 @ 09:21)  Source: .Blood Blood-Peripheral  Preliminary Report (02-11-22 @ 10:01):    No growth to date.    Culture - Urine (collected 02-10-22 @ 00:59)  Source: Clean Catch Clean Catch (Midstream)  Final Report (02-10-22 @ 22:48):    <10,000 CFU/mL Normal Urogenital Laisha        RADIOLOGY & ADDITIONAL TESTS:  PROCEDURE:   · Procedure Name	Interventional Radiology  · Procedure Name	cholecystostomy tube placement  · Procedure Date/Time	11-Feb-2022 16:05  · Informed Consent	Benefits, risks, and possible complications of procedure explained to patient/caregiver who verbalized understanding and gave written consent.  · Procedure Performed By	Myself  · Access Site (if applicable)	Right  · Specimen Obtained	Fluid sent for gram stain and culture  · Estimated Blood Loss	None  · Complications	No complications  · Contrast	None  · Sedation	Provided by Anesthesia Department  · Local Anesthesia	1% Lidocaine  · Procedure Findings and Details	8.5 fr cholecystostomy tube placed. ~50cc purulent bile aspirated. drain connected to bag.  · Patient Condition/Disposition	Stable      Electronic Signatures:  Jaden Mooney)  (Signed 11-Feb-2022 16:06)  	Authored: PROCEDURE      Last Updated: 11-Feb-2022 16:06 by Jaden Mooney)  Personally reviewed.     Consultant(s) Notes Reviewed:  [x] YES  [ ] NO

## 2022-02-12 NOTE — PROGRESS NOTE ADULT - PROBLEM SELECTOR PLAN 3
chronic  - continue with home medication Simvastatin 5mg daily at bedtime

## 2022-02-12 NOTE — PROGRESS NOTE ADULT - ASSESSMENT
62y Male with perforated GB s/p perc drain on 2/11 currently with some persistent pain. 62y Male with perforated GB s/p perc drain on 2/11 currently with some persistent pain.      Post Procedure Day #1, s/p IR cholecystostomy.  Patient personally seen and examined during PM rounds.  Pain improved, but not yet resolved.  Vitals non-suggestive.  Abdomen soft with appropriate/ mild RUQ tenderness and cholecystostomy secure.  Labs reassuring with decreasing WBCs and normal LFTs.  Clinically, improving overall.  Surgically, stable for present.  To continue current supportive care with IV ABX and advancing diet.

## 2022-02-13 ENCOUNTER — TRANSCRIPTION ENCOUNTER (OUTPATIENT)
Age: 62
End: 2022-02-13

## 2022-02-13 VITALS
TEMPERATURE: 98 F | OXYGEN SATURATION: 94 % | DIASTOLIC BLOOD PRESSURE: 81 MMHG | HEART RATE: 74 BPM | SYSTOLIC BLOOD PRESSURE: 145 MMHG | RESPIRATION RATE: 24 BRPM

## 2022-02-13 LAB
ALBUMIN SERPL ELPH-MCNC: 2.2 G/DL — LOW (ref 3.3–5)
ALP SERPL-CCNC: 86 U/L — SIGNIFICANT CHANGE UP (ref 40–120)
ALT FLD-CCNC: 22 U/L — SIGNIFICANT CHANGE UP (ref 12–78)
ANION GAP SERPL CALC-SCNC: 8 MMOL/L — SIGNIFICANT CHANGE UP (ref 5–17)
AST SERPL-CCNC: 19 U/L — SIGNIFICANT CHANGE UP (ref 15–37)
BASOPHILS # BLD AUTO: 0.16 K/UL — SIGNIFICANT CHANGE UP (ref 0–0.2)
BASOPHILS NFR BLD AUTO: 2 % — SIGNIFICANT CHANGE UP (ref 0–2)
BILIRUB SERPL-MCNC: 0.6 MG/DL — SIGNIFICANT CHANGE UP (ref 0.2–1.2)
BUN SERPL-MCNC: 4 MG/DL — LOW (ref 7–23)
CALCIUM SERPL-MCNC: 8 MG/DL — LOW (ref 8.5–10.1)
CHLORIDE SERPL-SCNC: 108 MMOL/L — SIGNIFICANT CHANGE UP (ref 96–108)
CO2 SERPL-SCNC: 24 MMOL/L — SIGNIFICANT CHANGE UP (ref 22–31)
CREAT SERPL-MCNC: 0.5 MG/DL — SIGNIFICANT CHANGE UP (ref 0.5–1.3)
EOSINOPHIL # BLD AUTO: 0.49 K/UL — SIGNIFICANT CHANGE UP (ref 0–0.5)
EOSINOPHIL NFR BLD AUTO: 6 % — SIGNIFICANT CHANGE UP (ref 0–6)
GLUCOSE SERPL-MCNC: 221 MG/DL — HIGH (ref 70–99)
HCT VFR BLD CALC: 38.4 % — LOW (ref 39–50)
HGB BLD-MCNC: 13.5 G/DL — SIGNIFICANT CHANGE UP (ref 13–17)
LYMPHOCYTES # BLD AUTO: 1.63 K/UL — SIGNIFICANT CHANGE UP (ref 1–3.3)
LYMPHOCYTES # BLD AUTO: 20 % — SIGNIFICANT CHANGE UP (ref 13–44)
MCHC RBC-ENTMCNC: 28.9 PG — SIGNIFICANT CHANGE UP (ref 27–34)
MCHC RBC-ENTMCNC: 35.2 GM/DL — SIGNIFICANT CHANGE UP (ref 32–36)
MCV RBC AUTO: 82.2 FL — SIGNIFICANT CHANGE UP (ref 80–100)
MONOCYTES # BLD AUTO: 0.65 K/UL — SIGNIFICANT CHANGE UP (ref 0–0.9)
MONOCYTES NFR BLD AUTO: 8 % — SIGNIFICANT CHANGE UP (ref 2–14)
NEUTROPHILS # BLD AUTO: 5.21 K/UL — SIGNIFICANT CHANGE UP (ref 1.8–7.4)
NEUTROPHILS NFR BLD AUTO: 63 % — SIGNIFICANT CHANGE UP (ref 43–77)
NRBC # BLD: SIGNIFICANT CHANGE UP /100 WBCS (ref 0–0)
PLATELET # BLD AUTO: 389 K/UL — SIGNIFICANT CHANGE UP (ref 150–400)
POTASSIUM SERPL-MCNC: 3.3 MMOL/L — LOW (ref 3.5–5.3)
POTASSIUM SERPL-SCNC: 3.3 MMOL/L — LOW (ref 3.5–5.3)
PROT SERPL-MCNC: 6.1 G/DL — SIGNIFICANT CHANGE UP (ref 6–8.3)
RBC # BLD: 4.67 M/UL — SIGNIFICANT CHANGE UP (ref 4.2–5.8)
RBC # FLD: 12.3 % — SIGNIFICANT CHANGE UP (ref 10.3–14.5)
SODIUM SERPL-SCNC: 140 MMOL/L — SIGNIFICANT CHANGE UP (ref 135–145)
WBC # BLD: 8.14 K/UL — SIGNIFICANT CHANGE UP (ref 3.8–10.5)
WBC # FLD AUTO: 8.14 K/UL — SIGNIFICANT CHANGE UP (ref 3.8–10.5)

## 2022-02-13 PROCEDURE — 76942 ECHO GUIDE FOR BIOPSY: CPT

## 2022-02-13 PROCEDURE — C1769: CPT

## 2022-02-13 PROCEDURE — 87040 BLOOD CULTURE FOR BACTERIA: CPT

## 2022-02-13 PROCEDURE — 83036 HEMOGLOBIN GLYCOSYLATED A1C: CPT

## 2022-02-13 PROCEDURE — 99285 EMERGENCY DEPT VISIT HI MDM: CPT | Mod: 25

## 2022-02-13 PROCEDURE — C1729: CPT

## 2022-02-13 PROCEDURE — 83690 ASSAY OF LIPASE: CPT

## 2022-02-13 PROCEDURE — 96376 TX/PRO/DX INJ SAME DRUG ADON: CPT

## 2022-02-13 PROCEDURE — 86901 BLOOD TYPING SEROLOGIC RH(D): CPT

## 2022-02-13 PROCEDURE — 96375 TX/PRO/DX INJ NEW DRUG ADDON: CPT

## 2022-02-13 PROCEDURE — 85610 PROTHROMBIN TIME: CPT

## 2022-02-13 PROCEDURE — 85730 THROMBOPLASTIN TIME PARTIAL: CPT

## 2022-02-13 PROCEDURE — 86850 RBC ANTIBODY SCREEN: CPT

## 2022-02-13 PROCEDURE — 47490 INCISION OF GALLBLADDER: CPT

## 2022-02-13 PROCEDURE — 87086 URINE CULTURE/COLONY COUNT: CPT

## 2022-02-13 PROCEDURE — 82962 GLUCOSE BLOOD TEST: CPT

## 2022-02-13 PROCEDURE — 81001 URINALYSIS AUTO W/SCOPE: CPT

## 2022-02-13 PROCEDURE — 87635 SARS-COV-2 COVID-19 AMP PRB: CPT

## 2022-02-13 PROCEDURE — 36415 COLL VENOUS BLD VENIPUNCTURE: CPT

## 2022-02-13 PROCEDURE — 86803 HEPATITIS C AB TEST: CPT

## 2022-02-13 PROCEDURE — 80053 COMPREHEN METABOLIC PANEL: CPT

## 2022-02-13 PROCEDURE — 85027 COMPLETE CBC AUTOMATED: CPT

## 2022-02-13 PROCEDURE — 93005 ELECTROCARDIOGRAM TRACING: CPT

## 2022-02-13 PROCEDURE — 74177 CT ABD & PELVIS W/CONTRAST: CPT | Mod: MA

## 2022-02-13 PROCEDURE — 96374 THER/PROPH/DIAG INJ IV PUSH: CPT

## 2022-02-13 PROCEDURE — 85025 COMPLETE CBC W/AUTO DIFF WBC: CPT

## 2022-02-13 PROCEDURE — 76000 FLUOROSCOPY <1 HR PHYS/QHP: CPT

## 2022-02-13 PROCEDURE — 86900 BLOOD TYPING SEROLOGIC ABO: CPT

## 2022-02-13 RX ORDER — METRONIDAZOLE 500 MG
1 TABLET ORAL
Qty: 9 | Refills: 0
Start: 2022-02-13 | End: 2022-02-15

## 2022-02-13 RX ORDER — POTASSIUM CHLORIDE 20 MEQ
40 PACKET (EA) ORAL ONCE
Refills: 0 | Status: COMPLETED | OUTPATIENT
Start: 2022-02-13 | End: 2022-02-13

## 2022-02-13 RX ORDER — CEFPODOXIME PROXETIL 100 MG
1 TABLET ORAL
Qty: 6 | Refills: 0
Start: 2022-02-13 | End: 2022-02-15

## 2022-02-13 RX ADMIN — Medication 2: at 05:40

## 2022-02-13 RX ADMIN — Medication 1000 MILLIGRAM(S): at 05:38

## 2022-02-13 RX ADMIN — Medication 40 MILLIEQUIVALENT(S): at 06:43

## 2022-02-13 RX ADMIN — PIPERACILLIN AND TAZOBACTAM 25 GRAM(S): 4; .5 INJECTION, POWDER, LYOPHILIZED, FOR SOLUTION INTRAVENOUS at 05:38

## 2022-02-13 RX ADMIN — LISINOPRIL 2.5 MILLIGRAM(S): 2.5 TABLET ORAL at 05:39

## 2022-02-13 NOTE — PROGRESS NOTE ADULT - PROBLEM SELECTOR PLAN 1
cont mod dose admelog corrective scale coverage qac/qhs  recommend add lantus 10 units qhs  goal bg 140-180 in hosp setting
- IR this afternoon for percutaneous cholecystostomy placement due to persistently elevated WBC & RUQ ttp  - NPO, IVF  - Cont IV Zosyn  - DVT ppx: scds  - pain control, supportive care, OOB, incentive spirometer  -Case discussed with Dr. Leyva    Surgical Team Contact Information  Spectralink: Ext: [phone] or [phone]  Pager: [phone]
- sepsis 2/2 acute cholecystitis with suspected perforation POA   - pt tachycardic with leukocytosis 16.27 on arrival   - continue on maintenance IVF @ 75cc/hr   - continue IV zosyn  - fu blood cultures (drawn after abx, unsure of accuracy)  - CT A/P revealed suspected cholelithiasis  - plan for IR for possible drainage today   - further plan per surgery
- In light of pain, will not adv diet for breakfast. will reassess after and decide  - pain control, supportive care  - OOB  - serial abdominal exams  - labs in am  - perc drain to gravity  - d/c planning after prieto low fat diet/medicine approval    Surgical Team Contact Information  Spectralink: Ext: [phone] or [phone]  Pager: [phone]
- sepsis 2/2 acute cholecystitis with suspected perforation POA- resolved   - wbc downtrending   - continue on maintenance IVF @ 75cc/hr until able to tolerate diet   - abx per surgery   - fu blood cultures (drawn after abx, unsure of accuracy)- NGTD   - POD 1 s/p perc drain   - advance diet per surgery   - cleared for dc from medical perspective once tolerating diet   - discussed with surgical pa
Case discussed with IR, if no improvement, will need cholecystostomy drain  Endo consult for Better glycemic control  Analgesia, anti emetics prn  Monitor white count, LFT;s and t bili.   Keep npo for now.   Medicine following as well.   Team discussed with Dr. Leyva.
s/p cholecystostomy drain 2/11/22  - K repleted  - D/c planning  - Endorsed to day team to discuss with Dr. Cook
- sepsis 2/2 acute cholecystitis with suspected perforation POA   - pt tachycardic with leukocytosis 16.27 on arrival   - continue on maintenance IVF @ 75cc/hr   - continue IV zosyn  - fu blood cultures (drawn after abx, unsure of accuracy)  - CT A/P revealed suspected cholelithiasis  - plan for IR for possible drainage tomorrow   - pt with no known history of ischemic or valvular disease, EKG reveals sinus tachycardia. he appears euvolemic on exam.  - denies any past intolerance to anesthesia. RCRI class 1 risk. pt is medically optimized to proceed with IR intervention.

## 2022-02-13 NOTE — DISCHARGE NOTE NURSING/CASE MANAGEMENT/SOCIAL WORK - PATIENT PORTAL LINK FT
You can access the FollowMyHealth Patient Portal offered by Health system by registering at the following website: [email or web address]. By joining [facility]’s FollowMyHealth portal, you will also be able to view your health information using other applications (apps) compatible with our system.

## 2022-02-13 NOTE — PROGRESS NOTE ADULT - PROBLEM SELECTOR PROBLEM 1
Acute cholecystitis
Acute cholecystitis
DM (diabetes mellitus), type 2
Acute cholecystitis

## 2022-02-13 NOTE — PROGRESS NOTE ADULT - PROBLEM/PLAN-1
DISPLAY PLAN FREE TEXT
Not applicable as gestational age is greater than or equal to 34 weeks.

## 2022-02-13 NOTE — PROGRESS NOTE ADULT - SUBJECTIVE AND OBJECTIVE BOX
SUBJECTIVE:  Patient seen and examined at bedside.  No overnight events.  Patient reports no new complaints at this time, eager to go home.  Tolerating regular diet.    VITALS  Vital Signs Last 24 Hrs  T(C): 36.7 (13 Feb 2022 04:44), Max: 37 (12 Feb 2022 20:36)  T(F): 98.1 (13 Feb 2022 04:44), Max: 98.6 (12 Feb 2022 20:36)  HR: 74 (13 Feb 2022 04:44) (74 - 85)  BP: 145/81 (13 Feb 2022 04:44) (113/61 - 145/81)  RR: 24 (13 Feb 2022 04:44) (17 - 24)  SpO2: 94% (13 Feb 2022 04:44) (94% - 95%)    PHYSICAL EXAM  GENERAL:  Well-nourished, well-developed male lying comfortably in bed in NAD.  HEENT:  Sclera white. Mucous membranes moist.  CARDIO:  Regular rate and rhythm.  No murmur, gallop or rub appreciated.  RESPIRATORY:  Clear to auscultation bilaterally.  No wheezing, rales or rhonchi appreciated.  ABDOMEN:  Soft, nondistended, nontender. Cholecystostomy drain in place with minimal bilious output; dressing over insertion site clean/dry. No rebound tenderness or guarding.  SKIN:  No jaundice, pallor, or cyanosis  NEURO:  A&O x 3    INTAKE & OUTPUT  I&O's Summary    11 Feb 2022 07:01  -  12 Feb 2022 07:00  --------------------------------------------------------  IN: 1280 mL / OUT: 800 mL / NET: 480 mL    12 Feb 2022 07:01  -  13 Feb 2022 06:09  --------------------------------------------------------  IN: 1100 mL / OUT: 2550 mL / NET: -1450 mL    I&O's Detail    11 Feb 2022 07:01  -  12 Feb 2022 07:00  --------------------------------------------------------  IN:    IV PiggyBack: 200 mL    Oral Fluid: 120 mL    Other (mL): 60 mL    sodium chloride 0.9%: 900 mL  Total IN: 1280 mL    OUT:    Other (mL): 50 mL    Voided (mL): 750 mL  Total OUT: 800 mL    Total NET: 480 mL    12 Feb 2022 07:01  -  13 Feb 2022 06:09  --------------------------------------------------------  IN:    IV PiggyBack: 200 mL    sodium chloride 0.9%: 900 mL  Total IN: 1100 mL    OUT:    Other (mL): 550 mL    Voided (mL): 2000 mL  Total OUT: 2550 mL    Total NET: -1450 mL    MEDICATIONS  MEDICATIONS  (STANDING):  acetaminophen     Tablet .. 1000 milliGRAM(s) Oral every 6 hours  dextrose 40% Gel 15 Gram(s) Oral once  dextrose 5%. 1000 milliLiter(s) (50 mL/Hr) IV Continuous <Continuous>  dextrose 5%. 1000 milliLiter(s) (100 mL/Hr) IV Continuous <Continuous>  dextrose 50% Injectable 25 Gram(s) IV Push once  dextrose 50% Injectable 12.5 Gram(s) IV Push once  dextrose 50% Injectable 25 Gram(s) IV Push once  famotidine  Oral Tab/Cap - Peds 40 milliGRAM(s) Oral at bedtime  glucagon  Injectable 1 milliGRAM(s) IntraMuscular once  insulin lispro (ADMELOG) corrective regimen sliding scale   SubCutaneous every 6 hours  lisinopril 2.5 milliGRAM(s) Oral daily  piperacillin/tazobactam IVPB.. 3.375 Gram(s) IV Intermittent every 8 hours  simvastatin 5 milliGRAM(s) Oral at bedtime  sodium chloride 0.9%. 1000 milliLiter(s) (75 mL/Hr) IV Continuous <Continuous>    MEDICATIONS  (PRN):  ketorolac   Injectable 15 milliGRAM(s) IV Push every 4 hours PRN Moderate Pain (4 - 6)  melatonin 3 milliGRAM(s) Oral at bedtime PRN Insomnia  ondansetron Injectable 4 milliGRAM(s) IV Push every 6 hours PRN Nausea    LABS:                        13.5   8.14  )-----------( 389      ( 13 Feb 2022 05:46 )             38.4     02-13    140  |  108  |  4<L>  ----------------------------<  221<H>  3.3<L>   |  24  |  0.50    Ca    8.0<L>      13 Feb 2022 05:46    TPro  x   /  Alb  2.2<L>  /  TBili  x   /  DBili  x   /  AST  19  /  ALT  22  /  AlkPhos  x   02-13    Culture - Blood (collected 10 Feb 2022 09:21)  Source: .Blood Blood-Peripheral  Preliminary Report (11 Feb 2022 10:01):    No growth to date.    Culture - Blood (collected 10 Feb 2022 09:21)  Source: .Blood Blood-Peripheral  Preliminary Report (11 Feb 2022 10:01):    No growth to date.

## 2022-02-13 NOTE — DISCHARGE NOTE NURSING/CASE MANAGEMENT/SOCIAL WORK - NSDCPEFALRISK_GEN_ALL_CORE
For information on Fall & Injury Prevention, visit: [email or web address] OR  [email or web address] OR  https://www.cdc.gov/steadi/patient.html

## 2022-02-14 PROBLEM — Z78.9 OTHER SPECIFIED HEALTH STATUS: Chronic | Status: ACTIVE | Noted: 2022-02-09

## 2022-02-15 LAB
CULTURE RESULTS: SIGNIFICANT CHANGE UP
CULTURE RESULTS: SIGNIFICANT CHANGE UP
SPECIMEN SOURCE: SIGNIFICANT CHANGE UP
SPECIMEN SOURCE: SIGNIFICANT CHANGE UP

## 2022-02-23 PROBLEM — Z00.00 ENCOUNTER FOR PREVENTIVE HEALTH EXAMINATION: Status: ACTIVE | Noted: 2022-02-23

## 2022-02-24 ENCOUNTER — APPOINTMENT (OUTPATIENT)
Dept: SURGERY | Facility: CLINIC | Age: 62
End: 2022-02-24
Payer: MEDICAID

## 2022-02-24 VITALS
WEIGHT: 170 LBS | HEIGHT: 68 IN | DIASTOLIC BLOOD PRESSURE: 81 MMHG | SYSTOLIC BLOOD PRESSURE: 124 MMHG | HEART RATE: 80 BPM | OXYGEN SATURATION: 98 % | BODY MASS INDEX: 25.76 KG/M2

## 2022-02-24 PROCEDURE — 99213 OFFICE O/P EST LOW 20 MIN: CPT

## 2023-01-11 NOTE — CONSULT NOTE ADULT - PROBLEM SELECTOR RECOMMENDATION 9
Airway  Date/Time: 1/11/2023 9:46 AM  Urgency: elective      General Information and Staff    Patient location during procedure: OR  Anesthesiologist: Trav Kramer MD  Performed: anesthesiologist     Indications and Patient Condition  Indications for airway management: anesthesia  Sedation level: deep  Preoxygenated: yes  Patient position: sniffing  Mask difficulty assessment: 1 - vent by mask    Final Airway Details  Final airway type: endotracheal airway      Successful airway: ETT  Cuffed: yes   Successful intubation technique: direct laryngoscopy  Endotracheal tube insertion site: oral  Blade: Ludy  Blade size: #3  ETT size (mm): 8.0    Cormack-Lehane Classification: grade I - full view of glottis  Placement verified by: chest auscultation and capnometry   Cuff volume (mL): 10  Measured from: lips  ETT to lips (cm): 22  Number of attempts at approach: 1    Additional Comments  atraumatic Type 2 A1c 10%  Recommend endocrine-Perlman on consult  FU appt: MARÍA  DSC recommendations: return to home regimen with insulin and glucose monitoring  diabetes education provided  Diabetes support info and cell # [phone] given   Goal 100-180 mg/dL; 140-180 mg/dL in critical care areas

## 2023-12-12 NOTE — PATIENT PROFILE ADULT - FUNCTIONAL ASSESSMENT - DAILY ACTIVITY 6.
CONSTITUTIONAL: No fever, weight loss, or fatigue  EYES: No eye pain, visual disturbances, or discharge  ENMT:  No difficulty hearing, tinnitus, vertigo; No sinus or throat pain  NECK: No pain or stiffness  RESPIRATORY: No cough, wheezing, chills or hemoptysis; No shortness of breath  CARDIOVASCULAR: No chest pain, palpitations, dizziness, or leg swelling  GASTROINTESTINAL: No abdominal or epigastric pain. No nausea, vomiting, or hematemesis; No diarrhea or constipation. No melena or hematochezia.  GENITOURINARY: No dysuria, frequency, hematuria, or incontinence  NEUROLOGICAL: pos tremors  SKIN: No itching, burning, rashes, or lesions   MUSCULOSKELETAL: No joint pain or swelling; No muscle, back, or extremity pain  PSYCHIATRIC: No depression, anxiety, mood swings, or difficulty sleeping  ALLERGY AND IMMUNOLOGIC: No hives or eczema 4 = No assist / stand by assistance

## 2025-03-19 ENCOUNTER — EMERGENCY (EMERGENCY)
Facility: HOSPITAL | Age: 65
LOS: 1 days | Discharge: HOME | End: 2025-03-19
Attending: EMERGENCY MEDICINE | Admitting: EMERGENCY MEDICINE
Payer: COMMERCIAL

## 2025-03-19 VITALS
SYSTOLIC BLOOD PRESSURE: 121 MMHG | WEIGHT: 158.73 LBS | RESPIRATION RATE: 18 BRPM | TEMPERATURE: 98 F | HEART RATE: 86 BPM | HEIGHT: 68 IN | OXYGEN SATURATION: 98 % | DIASTOLIC BLOOD PRESSURE: 80 MMHG

## 2025-03-19 PROCEDURE — 99285 EMERGENCY DEPT VISIT HI MDM: CPT

## 2025-03-20 LAB
ALBUMIN SERPL ELPH-MCNC: 3.6 G/DL — SIGNIFICANT CHANGE UP (ref 3.3–5)
ALP SERPL-CCNC: 65 U/L — SIGNIFICANT CHANGE UP (ref 40–120)
ALT FLD-CCNC: 24 U/L — SIGNIFICANT CHANGE UP (ref 12–78)
ANION GAP SERPL CALC-SCNC: 5 MMOL/L — SIGNIFICANT CHANGE UP (ref 5–17)
AST SERPL-CCNC: 24 U/L — SIGNIFICANT CHANGE UP (ref 15–37)
BASOPHILS # BLD AUTO: 0.05 K/UL — SIGNIFICANT CHANGE UP (ref 0–0.2)
BASOPHILS NFR BLD AUTO: 0.5 % — SIGNIFICANT CHANGE UP (ref 0–2)
BILIRUB SERPL-MCNC: 2.2 MG/DL — HIGH (ref 0.2–1.2)
BUN SERPL-MCNC: 16 MG/DL — SIGNIFICANT CHANGE UP (ref 7–23)
CALCIUM SERPL-MCNC: 9 MG/DL — SIGNIFICANT CHANGE UP (ref 8.5–10.1)
CHLORIDE SERPL-SCNC: 107 MMOL/L — SIGNIFICANT CHANGE UP (ref 96–108)
CO2 SERPL-SCNC: 23 MMOL/L — SIGNIFICANT CHANGE UP (ref 22–31)
CREAT SERPL-MCNC: 0.64 MG/DL — SIGNIFICANT CHANGE UP (ref 0.5–1.3)
EGFR: 106 ML/MIN/1.73M2 — SIGNIFICANT CHANGE UP
EGFR: 106 ML/MIN/1.73M2 — SIGNIFICANT CHANGE UP
EOSINOPHIL # BLD AUTO: 0.33 K/UL — SIGNIFICANT CHANGE UP (ref 0–0.5)
EOSINOPHIL NFR BLD AUTO: 3.6 % — SIGNIFICANT CHANGE UP (ref 0–6)
GLUCOSE SERPL-MCNC: 194 MG/DL — HIGH (ref 70–99)
HCT VFR BLD CALC: 44.5 % — SIGNIFICANT CHANGE UP (ref 39–50)
HGB BLD-MCNC: 15.4 G/DL — SIGNIFICANT CHANGE UP (ref 13–17)
IMM GRANULOCYTES NFR BLD AUTO: 0.3 % — SIGNIFICANT CHANGE UP (ref 0–0.9)
LIDOCAIN IGE QN: 32 U/L — SIGNIFICANT CHANGE UP (ref 13–75)
LYMPHOCYTES # BLD AUTO: 3.16 K/UL — SIGNIFICANT CHANGE UP (ref 1–3.3)
LYMPHOCYTES # BLD AUTO: 34.3 % — SIGNIFICANT CHANGE UP (ref 13–44)
MAGNESIUM SERPL-MCNC: 2 MG/DL — SIGNIFICANT CHANGE UP (ref 1.6–2.6)
MCHC RBC-ENTMCNC: 28.7 PG — SIGNIFICANT CHANGE UP (ref 27–34)
MCHC RBC-ENTMCNC: 34.6 G/DL — SIGNIFICANT CHANGE UP (ref 32–36)
MCV RBC AUTO: 83 FL — SIGNIFICANT CHANGE UP (ref 80–100)
MONOCYTES # BLD AUTO: 0.84 K/UL — SIGNIFICANT CHANGE UP (ref 0–0.9)
MONOCYTES NFR BLD AUTO: 9.1 % — SIGNIFICANT CHANGE UP (ref 2–14)
NEUTROPHILS # BLD AUTO: 4.81 K/UL — SIGNIFICANT CHANGE UP (ref 1.8–7.4)
NEUTROPHILS NFR BLD AUTO: 52.2 % — SIGNIFICANT CHANGE UP (ref 43–77)
NRBC BLD AUTO-RTO: 0 /100 WBCS — SIGNIFICANT CHANGE UP (ref 0–0)
PLATELET # BLD AUTO: 296 K/UL — SIGNIFICANT CHANGE UP (ref 150–400)
POTASSIUM SERPL-MCNC: 4.3 MMOL/L — SIGNIFICANT CHANGE UP (ref 3.5–5.3)
POTASSIUM SERPL-SCNC: 4.3 MMOL/L — SIGNIFICANT CHANGE UP (ref 3.5–5.3)
PROT SERPL-MCNC: 6.8 G/DL — SIGNIFICANT CHANGE UP (ref 6–8.3)
RBC # BLD: 5.36 M/UL — SIGNIFICANT CHANGE UP (ref 4.2–5.8)
RBC # FLD: 12.8 % — SIGNIFICANT CHANGE UP (ref 10.3–14.5)
SODIUM SERPL-SCNC: 135 MMOL/L — SIGNIFICANT CHANGE UP (ref 135–145)
WBC # BLD: 9.22 K/UL — SIGNIFICANT CHANGE UP (ref 3.8–10.5)
WBC # FLD AUTO: 9.22 K/UL — SIGNIFICANT CHANGE UP (ref 3.8–10.5)

## 2025-03-20 PROCEDURE — 99284 EMERGENCY DEPT VISIT MOD MDM: CPT | Mod: 25

## 2025-03-20 PROCEDURE — 83690 ASSAY OF LIPASE: CPT

## 2025-03-20 PROCEDURE — 85025 COMPLETE CBC W/AUTO DIFF WBC: CPT

## 2025-03-20 PROCEDURE — 96374 THER/PROPH/DIAG INJ IV PUSH: CPT

## 2025-03-20 PROCEDURE — 74177 CT ABD & PELVIS W/CONTRAST: CPT | Mod: MC

## 2025-03-20 PROCEDURE — 74177 CT ABD & PELVIS W/CONTRAST: CPT | Mod: 26

## 2025-03-20 PROCEDURE — 83735 ASSAY OF MAGNESIUM: CPT

## 2025-03-20 PROCEDURE — 80053 COMPREHEN METABOLIC PANEL: CPT

## 2025-03-20 RX ORDER — LOPERAMIDE HCL 1 MG/7.5ML
2 SOLUTION ORAL ONCE
Refills: 0 | Status: COMPLETED | OUTPATIENT
Start: 2025-03-20 | End: 2025-03-20

## 2025-03-20 RX ADMIN — Medication 20 MILLIGRAM(S): at 00:29

## 2025-03-20 RX ADMIN — LOPERAMIDE HCL 2 MILLIGRAM(S): 1 SOLUTION ORAL at 03:21

## 2025-03-20 RX ADMIN — Medication 1000 MILLILITER(S): at 00:30
